# Patient Record
Sex: MALE | Race: WHITE | Employment: OTHER | URBAN - METROPOLITAN AREA
[De-identification: names, ages, dates, MRNs, and addresses within clinical notes are randomized per-mention and may not be internally consistent; named-entity substitution may affect disease eponyms.]

---

## 2017-02-07 ENCOUNTER — TELEPHONE (OUTPATIENT)
Dept: FAMILY MEDICINE | Facility: CLINIC | Age: 62
End: 2017-02-07

## 2017-02-08 NOTE — TELEPHONE ENCOUNTER
Omeprazole       Last Written Prescription Date: 09/21/2016  Last Fill Quantity: 90,  # refills: 3   Last Office Visit with G, [facility] or [facility] prescribing provider: 11/07/2016

## 2017-02-08 NOTE — TELEPHONE ENCOUNTER
MA - please call pharmacy and verify of the refill requested below.  Last refilled on 9/21/16 for a year worth of refills and it has been confirmed.    Beatrice MORRISON, RN, BSN

## 2017-04-29 DIAGNOSIS — F41.9 ANXIETY: ICD-10-CM

## 2017-05-01 RX ORDER — CITALOPRAM HYDROBROMIDE 20 MG/1
TABLET ORAL
Qty: 90 TABLET | Refills: 0 | Status: SHIPPED | OUTPATIENT
Start: 2017-05-01 | End: 2017-07-11

## 2017-05-01 NOTE — TELEPHONE ENCOUNTER
Prescription approved per [facility].  Guerda Thomas RN

## 2017-07-07 ENCOUNTER — MYC MEDICAL ADVICE (OUTPATIENT)
Dept: FAMILY MEDICINE | Facility: CLINIC | Age: 62
End: 2017-07-07

## 2017-07-11 ENCOUNTER — OFFICE VISIT (OUTPATIENT)
Dept: FAMILY MEDICINE | Facility: CLINIC | Age: 62
End: 2017-07-11
Payer: COMMERCIAL

## 2017-07-11 VITALS
BODY MASS INDEX: 21.66 KG/M2 | HEIGHT: 65 IN | WEIGHT: 130 LBS | HEART RATE: 66 BPM | SYSTOLIC BLOOD PRESSURE: 110 MMHG | OXYGEN SATURATION: 99 % | DIASTOLIC BLOOD PRESSURE: 62 MMHG | TEMPERATURE: 98.3 F

## 2017-07-11 DIAGNOSIS — G25.81 RESTLESS LEGS SYNDROME (RLS): Primary | ICD-10-CM

## 2017-07-11 DIAGNOSIS — Z87.891 HISTORY OF TOBACCO USE: ICD-10-CM

## 2017-07-11 DIAGNOSIS — F41.9 ANXIETY: ICD-10-CM

## 2017-07-11 PROCEDURE — 99214 OFFICE O/P EST MOD 30 MIN: CPT | Performed by: FAMILY MEDICINE

## 2017-07-11 PROCEDURE — G0296 VISIT TO DETERM LDCT ELIG: HCPCS | Performed by: FAMILY MEDICINE

## 2017-07-11 RX ORDER — CITALOPRAM HYDROBROMIDE 20 MG/1
TABLET ORAL
Qty: 90 TABLET | Refills: 1 | Status: SHIPPED | OUTPATIENT
Start: 2017-07-11

## 2017-07-11 RX ORDER — PRAMIPEXOLE DIHYDROCHLORIDE 0.25 MG/1
0.25 TABLET ORAL AT BEDTIME
Qty: 30 TABLET | Refills: 1 | Status: SHIPPED | OUTPATIENT
Start: 2017-07-11 | End: 2017-08-11

## 2017-07-11 NOTE — MR AVS SNAPSHOT
After Visit Summary   7/11/2017    Tanisha Albrecht    MRN: [id number]           Patient Information     Date Of Birth          1955        Visit Information        Provider Department      7/11/2017 5:40 PM Lovely Oconnell MD [facility]        Today's Diagnoses     History of tobacco use    -  1    Anxiety        Restless legs syndrome (RLS)          Care Instructions      Lung Cancer Screening   Frequently Asked Questions  If you are at high-risk for lung cancer, getting screened with low-dose computed tomography (LDCT) every year can help save your life. This handout offers answers to some of the most common questions about lung cancer screening. If you have other questions, please call [phone] ([phone]).     What is it?  Lung cancer screening uses special X-ray technology to create an image of your lung tissue. The exam is quick and easy and takes less than 10 seconds. We don t give you any medicine or use any needles. You can eat before and after the exam. You don t need to change your clothes as long as the clothing on your chest doesn t contain metal. But, you do need to be able to hold your breath for at least 6 seconds during the exam.    What is the goal of lung cancer screening?  The goal of lung cancer screening is to save lives. Many times, lung cancer is not found until a person starts having physical symptoms. Lung cancer screening can help detect lung cancer in the earliest stages when it may be easier to treat.    Who should be screened for lung cancer?  We suggest lung cancer screening for anyone who is at high-risk for lung cancer. You are in the high-risk group if you:      are between the ages of 55 and 79, and    have smoked at least 1 pack of cigarettes a day for 30 or more years, and    still smoke or have quit within the past 15 years.    However, if you have a new cough or shortness of breath, you should talk to your doctor before being  screened.    Some national lung health advocacy groups also recommend screening for people ages 50 to 79 who have smoked an average of 1 pack of cigarettes a day for 20 years. They must also have at least 1 other risk factor for lung cancer, not including exposure to secondhand smoke. Other risk factors are having had cancer in the past, emphysema, pulmonary fibrosis, COPD, a family history of lung cancer, or exposure to certain materials such as arsenic, asbestos, beryllium, cadmium, chromium, diesel fumes, nickel, radon or silica. Your care team can help you know if you have one of these risk factors.     Why does it matter if I have symptoms?  Certain symptoms can be a sign that you have a condition in your lungs that should be checked and treated by your doctor. These symptoms include fever, chest pain, a new or changing cough, shortness of breath that you have never felt before, coughing up blood or unexplained weight loss. Having any of these symptoms can greatly affect the results of lung cancer screening.       Should all smokers get an LDCT lung cancer screening exam?  It depends. Lung cancer screening is for a very specific group of men and women who have a history of heavy smoking over a long period of time (see  Who should be screened for lung cancer  above).  I am in the high-risk group, but have been diagnosed with cancer in the past. Is LDCT lung cancer screening right for me?  In some cases, you should not have LDCT lung screening, such as when your doctor is already following your cancer with CT scan studies. Your doctor will help you decide if LDCT lung screening is right for you.  Do I need to have a screening exam every year?  Yes. If you are in the high-risk group described earlier, you should get an LDCT lung cancer screening exam every year until you are 79, or are no longer willing or able to undergo screening and possible procedures to diagnose and treat lung cancer.  How effective is LDCT  at preventing death from lung cancer?  Studies have shown that LDCT lung cancer screening can lower the risk of death from lung cancer by 20 percent in people who are at high-risk.  What are the risks?  There are some risks and limitations of LDCT lung cancer screening. We want to make sure you understand the risks and benefits, so please let us know if you have any questions. Your doctor may want to talk with you more about these risks.    Radiation exposure: As with any exam that uses radiation, there is a very small increased risk of cancer. The amount of radiation in LDCT is small--about the same amount a person would get from a mammogram. Your doctor orders the exam when he or she feels the potential benefits outweigh the risks.    False negatives: No test is perfect, including LDCT. It is possible that you may have a medical condition, including lung cancer, that is not found during your exam. This is called a false negative result.    False positives and more testing: LDCT very often finds something in the lung that could be cancer, but in fact is not. This is called a false positive result. False positive tests often cause anxiety. To make sure these findings are not cancer, you may need to have more tests. These tests will be done only if you give us permission. Sometimes patients need a treatment that can have side effects, such as a biopsy. For more information on false positives, see  What can I expect from the results?     Findings not related to lung cancer: Your LDCT exam also takes pictures of areas of your body next to your lungs. In a very small number of cases, the CT scan will show an abnormal finding in one of these areas, such as your kidneys, adrenal glands, liver or thyroid. This finding may not be serious, but you may need more tests. Your doctor can help you decide what other tests you may need, if any.  What can I expect from the results?  About 1 out of 4 LDCT exams will find something  that may need more tests. Most of the time, these findings are lung nodules. Lung nodules are very small collections of tissue in the lung. These nodules are very common, and the vast majority--more than 97 percent--are not cancer (benign). Most are normal lymph nodes or small areas of scarring from past infections.  But, if a small lung nodule is found to be cancer, the cancer can be cured more than 90 percent of the time. To know if the nodule is cancer, we may need to get more images before your next yearly screening exam. If the nodule has suspicious features (for example, it is large, has an odd shape or grows over time), we will refer you to a specialist for further testing.  Will my doctor also get the results?  Yes. Your doctor will get a copy of your results.  Is it okay to keep smoking now that there s a cancer screening exam?  No. Tobacco is one of the strongest cancer-causing agents. It causes not only lung cancer, but other cancers and cardiovascular (heart) diseases as well. The damage caused by smoking builds over time. This means that the longer you smoke, the higher your risk of disease. While it is never too late to quit, the sooner you quit, the better.  Where can I find help to quit smoking?  The best way to prevent lung cancer is to stop smoking. If you have already quit smoking, congratulations and keep it up! For help on quitting smoking, please call [employer] at 2-239-168-ZYAV ([phone] or the American Cancer Society at 1-559.957.3489 to find local resources near you.  One-on-one health coaching:  If you d prefer to work individually with a health care provider on tobacco cessation, we offer:      Medication Therapy Management:  Our specially trained pharmacists work closely with you and your doctor to help you quit smoking.  Call [phone] or [phone] (toll free).     Can Do: Health coaching offered by [facility].  [email or web address]            Follow-ups after your  "visit        Future tests that were ordered for you today     Open Future Orders        Priority Expected Expires Ordered    CT Chest Lung Cancer Scrn Low Dose wo Routine  7/11/2018 7/11/2017            Who to contact     If you have questions or need follow up information about today's clinic visit or your schedule please contact [facility] directly at [phone].  Normal or non-critical lab and imaging results will be communicated to you by MyChart, letter or phone within 4 business days after the clinic has received the results. If you do not hear from us within 7 days, please contact the clinic through Shopsense[facility] or phone. If you have a critical or abnormal lab result, we will notify you by phone as soon as possible.  Submit refill requests through [employer] or call your pharmacy and they will forward the refill request to us. Please allow 3 business days for your refill to be completed.          Additional Information About Your Visit        Shops[facility]bre Information     [facility] gives you secure access to your electronic health record. If you see a primary care provider, you can also send messages to your care team and make appointments. If you have questions, please call your primary care clinic.  If you do not have a primary care provider, please call [phone] and they will assist you.        Care EveryWhere ID     This is your Care EveryWhere ID. This could be used by other organizations to access your [place] medical records  [id number]        Your Vitals Were     Pulse Temperature Height Pulse Oximetry BMI (Body Mass Index)       66 98.3  F (36.8  C) (Oral) 5' 5\" (1.651 m) 99% 21.63 kg/m2        Blood Pressure from Last 3 Encounters:   07/11/17 110/62   11/07/16 114/60   10/03/16 106/70    Weight from Last 3 Encounters:   07/11/17 130 lb (59 kg)   09/21/16 127 lb (57.6 kg)   01/05/16 128 lb (58.1 kg)              We Performed the Following     Okay for Smoking Cessation Study (PLUTO) to Contact " Patient     Prof fee: Shared Decisionmaking for Lung Cancer Screening          Today's Medication Changes          These changes are accurate as of: 7/11/17  6:18 PM.  If you have any questions, ask your nurse or doctor.               Start taking these medicines.        Dose/Directions    pramipexole 0.25 MG tablet   Commonly known as:  MIRAPEX   Used for:  Restless legs syndrome (RLS)   Started by:  Lovely Oconnell MD        Dose:  0.25 mg   Take 1 tablet (0.25 mg) by mouth At Bedtime   Quantity:  30 tablet   Refills:  1         These medicines have changed or have updated prescriptions.        Dose/Directions    citalopram 20 MG tablet   Commonly known as:  celeXA   This may have changed:  See the new instructions.   Used for:  Anxiety   Changed by:  Lovely Oconnell MD        TAKE 1 TABLET(20 MG) BY MOUTH DAILY   Quantity:  90 tablet   Refills:  1       * nicotine polacrilex 4 MG gum   Commonly known as:  NICORETTE   This may have changed:  Another medication with the same name was added. Make sure you understand how and when to take each.   Used for:  Cigarette smoker   Changed by:  Lovely Oconnell MD        Dose:  4 mg   Place 1 each (4 mg) inside cheek as needed for smoking cessation   Quantity:  270 tablet   Refills:  1       * nicotine polacrilex 4 MG gum   Commonly known as:  NICORETTE   This may have changed:  You were already taking a medication with the same name, and this prescription was added. Make sure you understand how and when to take each.   Used for:  History of tobacco use   Changed by:  Lovely Oconnell MD        Dose:  4 mg   Place 1 each (4 mg) inside cheek as needed for smoking cessation   Quantity:  270 tablet   Refills:  1       * Notice:  This list has 2 medication(s) that are the same as other medications prescribed for you. Read the directions carefully, and ask your doctor or other care provider to review them with you.         Where to get your medicines      These medications were sent to  [facility] - [address]  [address] [facility] [address]     Phone:  [phone]     citalopram 20 MG tablet    nicotine polacrilex 4 MG gum         These medications were sent to [facility] [place], [place] - [address]  [address], [facility] [address]     Phone:  [phone]     pramipexole 0.25 MG tablet                Primary Care Provider Office Phone # Fax #    Lovely Oconnell [phone]       [facility] [address] [address]        Equal Access to Services     ARLIN JERRY : Hadii russell girard hadasho Soomaali, waaxda luqadaha, qaybta kaalmada adeegyada, mirela nguyen . So [facility] [phone].    ATENCIÓN: Si habla español, tiene a robles disposición servicios gratuitos de asistencia lingüística. [facility] [phone].    We comply with applicable federal civil rights laws and [place] laws. We do not discriminate on the basis of race, color, national origin, age, disability sex, sexual orientation or gender identity.            Thank you!     Thank you for choosing [facility]  for your care. Our goal is always to provide you with excellent care. Hearing back from our patients is one way we can continue to improve our services. Please take a few minutes to complete the written survey that you may receive in the mail after your visit with us. Thank you!             Your Updated Medication List - Protect others around you: Learn how to safely use, store and throw away your medicines at www.disposemymeds.org.          This list is accurate as of: 7/11/17  6:18 PM.  Always use your most recent med list.                   Brand Name Dispense Instructions for use Diagnosis    aspirin 81 MG tablet      Take 1 tablet by mouth daily.        citalopram 20 MG tablet    celeXA    90 tablet    TAKE 1 TABLET(20 MG) BY MOUTH DAILY    Anxiety        clonazePAM 1 MG tablet    klonoPIN    30 tablet    Take 1 tablet (1 mg) by mouth nightly as needed for anxiety    Restless legs syndrome (RLS)       loratadine 10 MG tablet    CLARITIN     1 TABLET DAILY        MULTI VITAMIN MENS PO           * nicotine polacrilex 4 MG gum    NICORETTE    270 tablet    Place 1 each (4 mg) inside cheek as needed for smoking cessation    Cigarette smoker       * nicotine polacrilex 4 MG gum    NICORETTE    270 tablet    Place 1 each (4 mg) inside cheek as needed for smoking cessation    History of tobacco use       omeprazole 20 MG CR capsule    priLOSEC    90 capsule    TAKE 1 CAPSULE BY MOUTH ONCE DAILY    Gastroesophageal reflux disease without esophagitis       polyethylene glycol powder    MIRALAX    510 g    Take 17 g (1 capful) by mouth daily    Chronic constipation       pramipexole 0.25 MG tablet    MIRAPEX    30 tablet    Take 1 tablet (0.25 mg) by mouth At Bedtime    Restless legs syndrome (RLS)       simvastatin 20 MG tablet    ZOCOR    90 tablet    Take 1 tablet (20 mg) by mouth At Bedtime    Hyperlipidemia LDL goal <130       tamsulosin 0.4 MG capsule    FLOMAX    90 capsule    Take 1 capsule (0.4 mg) by mouth At Bedtime    Benign non-nodular prostatic hyperplasia with lower urinary tract symptoms       * Notice:  This list has 2 medication(s) that are the same as other medications prescribed for you. Read the directions carefully, and ask your doctor or other care provider to review them with you.

## 2017-07-11 NOTE — PROGRESS NOTES
Lung Cancer Screening Shared Decision Making Visit     Tanisha Albrecht is eligible for lung cancer screening on the basis of the information provided in my signed lung cancer screening order.     I have discussed with patient the risks and benefits of screening for lung cancer with low-dose CT.     The risks include:   radiation exposure    false positives     over-diagnosis    The benefit of early detection of lung cancer is contingent upon adherence to annual screening or more frequent follow up if indicated.     Furthermore, reaping the benefits of screening requires Tanisha Albrecht to be willing and physically able to undergo diagnostic procedures, if indicated. Although no specific guide is available for determining severity of comorbidities, it is reasonable to withhold screening in patients who have greater mortality risk from other diseases.     We did discusShouldIScreens that the only way to prevent lung cancer is to not smoke. Smoking cessation assistance was offered.

## 2017-07-11 NOTE — PATIENT INSTRUCTIONS

## 2017-07-11 NOTE — PROGRESS NOTES
SUBJECTIVE:                                                    Tanisha Albrecht is a 62 year old male who presents to clinic today for the following health issues:    Musculoskeletal problem/pain      Duration: has been going on for years but getting worse    Description  Location: restless legs and Periodic Leg movement  He has taken Klonipin for years    Intensity:  moderate    Accompanying signs and symptoms: twitches, gets real aggravatted    History  Previous similar problem: YES  Previous evaluation:  Sleep study    Precipitating or alleviating factors:  Trauma or overuse: no   Aggravating factors include: sleeping    Therapies tried and outcome: Klonopin 1 mg which helps but wanting to get something stronger if available          Problem list and histories reviewed & adjusted, as indicated.  Additional history: as documented    Patient Active Problem List   Diagnosis     GERD (gastroesophageal reflux disease)     Cigarette smoker     Anxiety     Hyperlipidemia LDL goal <130     Periodic limb movement sleep disorder     Advanced directives, counseling/discussion     Gallstones     History of depression     Disturbance in sleep behavior     Gastroesophageal reflux disease without esophagitis     Tubular adenoma of colon     Essential hypertension with goal blood pressure less than 140/90     Restless legs syndrome (RLS)     Hydronephrosis, unspecified hydronephrosis type     Male impotence     No past surgical history on file.    Social History   Substance Use Topics     Smoking status: Current Every Day Smoker     Packs/day: 0.50     Years: 25.00     Types: Cigarettes     Smokeless tobacco: Former User     Types: Chew     Alcohol use 0.0 oz/week     0 Standard drinks or equivalent per week      Comment: Occasionally     Family History   Problem Relation Age of Onset     HEART DISEASE Mother      Alzheimer Disease Father      CANCER Father      CANCER Brother      pancreas     DIABETES Father      CANCER Other       melanoma         Current Outpatient Prescriptions   Medication Sig Dispense Refill     clonazePAM (KLONOPIN) 1 MG tablet Take 1 tablet (1 mg) by mouth nightly as needed for anxiety 30 tablet 0     citalopram (CELEXA) 20 MG tablet TAKE 1 TABLET(20 MG) BY MOUTH DAILY 90 tablet 1     nicotine polacrilex (NICORETTE) 4 MG gum Place 1 each (4 mg) inside cheek as needed for smoking cessation 270 tablet 1     pramipexole (MIRAPEX) 0.25 MG tablet Take 1 tablet (0.25 mg) by mouth At Bedtime 30 tablet 1     tamsulosin (FLOMAX) 0.4 MG 24 hr capsule Take 1 capsule (0.4 mg) by mouth At Bedtime 90 capsule 3     simvastatin (ZOCOR) 20 MG tablet Take 1 tablet (20 mg) by mouth At Bedtime 90 tablet 3     omeprazole (PRILOSEC) 20 MG capsule TAKE 1 CAPSULE BY MOUTH ONCE DAILY 90 capsule 3     polyethylene glycol (MIRALAX) powder Take 17 g (1 capful) by mouth daily 510 g 1     aspirin 81 MG tablet Take 1 tablet by mouth daily.       MULTI VITAMIN MENS PO        LORATADINE 10 MG PO TABS 1 TABLET DAILY       [DISCONTINUED] clonazePAM (KLONOPIN) 1 MG tablet Due for MD visit  0     [DISCONTINUED] citalopram (CELEXA) 20 MG tablet TAKE 1 TABLET(20 MG) BY MOUTH DAILY 90 tablet 0     nicotine polacrilex (NICORETTE) 4 MG gum Place 1 each (4 mg) inside cheek as needed for smoking cessation (Patient not taking: Reported on 7/11/2017) 270 tablet 1     Allergies   Allergen Reactions     Chantix [Varenicline Tartrate]      Wellbutrin [Bupropion Hcl]      Varenicline Anxiety     Recent Labs   Lab Test  09/21/16   1542  01/06/16   1339  07/28/15   1535  06/18/15   1601   11/21/14   1218   LDL  106*  80   --    --    --   75   HDL  50  59   --    --    --   51   TRIG  75  121   --    --    --   91   ALT  16  17   --   16   < >  21   CR  0.86  0.93   --   0.86   < >  0.89   GFRESTIMATED  >90  Non  GFR Calc    83   --   >90  Non  GFR Calc     < >  87   GFRESTBLACK  >90   GFR Calc    >90    "GFR Calc     --   >90   GFR Calc     < >  >90   GFR Calc     POTASSIUM  4.1  4.6   --   4.3   < >  4.3   TSH  0.61   --   0.62   --    < >   --     < > = values in this interval not displayed.      BP Readings from Last 3 Encounters:   07/11/17 110/62   11/07/16 114/60   10/03/16 106/70    Wt Readings from Last 3 Encounters:   07/11/17 130 lb (59 kg)   09/21/16 127 lb (57.6 kg)   01/05/16 128 lb (58.1 kg)            Anxiety Follow-Up    Status since last visit: stable     Other associated symptoms:None    Complicating factors:   Significant life event: No   Current substance abuse: None  Depression symptoms: No  RULA-7 SCORE 6/23/2015 9/19/2016 9/21/2016   Total Score 0 - -   Total Score - - -   Total Score - 1 7       GAD7            Amount of exercise or physical activity: None    Problems taking medications regularly: No    Medication side effects: none              Labs reviewed in EPIC    Reviewed and updated as needed this visit by clinical staff       Reviewed and updated as needed this visit by Provider         ROS:  C: NEGATIVE for fever, chills, change in weight  R: NEGATIVE for significant cough or SOB  CV: NEGATIVE for chest pain, palpitations or peripheral edema  PSYCHIATRIC: NEGATIVE for changes in mood or affect    OBJECTIVE:     /62 (BP Location: Right arm, Patient Position: Chair, Cuff Size: Adult Regular)  Pulse 66  Temp 98.3  F (36.8  C) (Oral)  Ht 5' 5\" (1.651 m)  Wt 130 lb (59 kg)  SpO2 99%  BMI 21.63 kg/m2  Body mass index is 21.63 kg/(m^2).  GENERAL: healthy, alert and no distress  NECK: no adenopathy, no asymmetry, masses, or scars and thyroid normal to palpation  RESP: lungs clear to auscultation - no rales, rhonchi or wheezes  CV: regular rate and rhythm, normal S1 S2, no S3 or S4, no murmur, click or rub, no peripheral edema and peripheral pulses strong  ABDOMEN: soft, nontender, no hepatosplenomegaly, no masses and bowel sounds normal  MS: no " gross musculoskeletal defects noted, no edema  PSYCH: mentation appears normal, affect normal/bright    Diagnostic Test Results:  none     ASSESSMENT/PLAN:         Tobacco Cessation:   reports that he has been smoking Cigarettes.  He has a 12.50 pack-year smoking history. He has quit using smokeless tobacco. His smokeless tobacco use included Chew.  Tobacco Cessation Action Plan: Pharmacotherapies : other Nicotine replacement    1. Restless legs syndrome (RLS)  Advised Try Mirapex  SEE [facility] orders  The potential side effects of this medication have been discussed with the patient.  Call if any significant problems with these are experienced.  If Not better will continue Klonipin  - pramipexole (MIRAPEX) 0.25 MG tablet; Take 1 tablet (0.25 mg) by mouth At Bedtime  Dispense: 30 tablet; Refill: 1    2. Anxiety  Doing well-see RULA and PHQ9  - citalopram (CELEXA) 20 MG tablet; TAKE 1 TABLET(20 MG) BY MOUTH DAILY  Dispense: 90 tablet; Refill: 1      3. History of tobacco use  Advised smoking cessation  Pt wants Gum  SEE [facility] orders  The potential side effects of this medication have been discussed with the patient.  Call if any significant problems with these are experienced.    - Prof fee: Shared Decisionmaking for Lung Cancer Screening  - CT Chest Lung Cancer Scrn Low Dose wo; Future  - Okay for Smoking Cessation Study (PLUTO) to Contact Patient  - nicotine polacrilex (NICORETTE) 4 MG gum; Place 1 each (4 mg) inside cheek as needed for smoking cessation  Dispense: 270 tablet; Refill: 1  Follow up if This does not work  Lovely Oconnell MD  [facility]

## 2017-07-11 NOTE — NURSING NOTE
"Chief Complaint   Patient presents with     Musculoskeletal Problem     restless legs       Initial /62 (BP Location: Right arm, Patient Position: Chair, Cuff Size: Adult Regular)  Pulse 66  Temp 98.3  F (36.8  C) (Oral)  Ht 5' 5\" (1.651 m)  Wt 130 lb (59 kg)  SpO2 99%  BMI 21.63 kg/m2 Estimated body mass index is 21.63 kg/(m^2) as calculated from the following:    Height as of this encounter: 5' 5\" (1.651 m).    Weight as of this encounter: 130 lb (59 kg).  Medication Reconciliation: complete    "

## 2017-07-12 ASSESSMENT — PATIENT HEALTH QUESTIONNAIRE - PHQ9: SUM OF ALL RESPONSES TO PHQ QUESTIONS 1-9: 5

## 2017-07-13 ENCOUNTER — RADIANT APPOINTMENT (OUTPATIENT)
Dept: CT IMAGING | Facility: CLINIC | Age: 62
End: 2017-07-13
Attending: FAMILY MEDICINE
Payer: COMMERCIAL

## 2017-07-13 DIAGNOSIS — Z87.891 HISTORY OF TOBACCO USE: ICD-10-CM

## 2017-07-13 PROCEDURE — G0297 LDCT FOR LUNG CA SCREEN: HCPCS | Mod: TC

## 2017-07-27 DIAGNOSIS — F41.9 ANXIETY: ICD-10-CM

## 2017-07-27 RX ORDER — CITALOPRAM HYDROBROMIDE 20 MG/1
TABLET ORAL
Qty: 90 TABLET | Refills: 0
Start: 2017-07-27

## 2017-07-27 NOTE — TELEPHONE ENCOUNTER
Called pharmacy and told them that this prescription is being sent to a different location.     Jessica Sears MA

## 2017-08-13 DIAGNOSIS — K21.9 GASTROESOPHAGEAL REFLUX DISEASE WITHOUT ESOPHAGITIS: ICD-10-CM

## 2017-08-14 NOTE — TELEPHONE ENCOUNTER
Omeprazole       Last Written Prescription Date: 08/11/2017  Last Fill Quantity: 90,  # refills: 3   Last Office Visit with [facility], [facility] or [facility] prescribing provider: 07/11/217

## 2017-08-31 ENCOUNTER — MYC MEDICAL ADVICE (OUTPATIENT)
Dept: FAMILY MEDICINE | Facility: CLINIC | Age: 62
End: 2017-08-31

## 2017-08-31 DIAGNOSIS — G25.81 RESTLESS LEGS SYNDROME (RLS): ICD-10-CM

## 2017-08-31 RX ORDER — CLONAZEPAM 1 MG/1
TABLET ORAL
Qty: 30 TABLET | Refills: 0 | Status: CANCELLED | OUTPATIENT
Start: 2017-08-31

## 2017-09-01 RX ORDER — CLONAZEPAM 1 MG/1
1 TABLET ORAL
Qty: 30 TABLET | Refills: 0 | Status: SHIPPED | OUTPATIENT
Start: 2017-09-01 | End: 2017-10-05

## 2017-09-01 NOTE — TELEPHONE ENCOUNTER
Patient and patient wife sent this message:    Dear dr. madrid,  Our   insurance company is saying that instead of Tanisha seeing you (his preferred provider) we went out of network to see a [facility]. so they are not treating his visit as medical in nature.  He went in for medication refills and to change his meds. to a on line pharmacy [facility] insurance  [facility] for all long term drugs.  Tanisha and I have been taken citalopram for years for mild depression. As seniors  in todays world there is a lot of issues to get depressed about. Tanisha had side affects on Pramipexole Dihydro.25Mg  I put him back on Clonazepam 1 mg the sleep study doctor had him on. he is doing fine and sleeping all night and no leg movement. It has to be renewed again as it is a 30 day supply med. so it needs refill at [facility] #[phone] Phone # [phone].           Thank you tanisha and Maura Albrecht    Controlled Substance Refill Request for Klonopin  Problem List Complete:  No     PROVIDER TO CONSIDER COMPLETION OF PROBLEM LIST AND OVERVIEW/CONTROLLED SUBSTANCE AGREEMENT    Last Written Prescription Date:  7/11/17  Last Fill Quantity: 30,   # refills: 0    [place] with [facility] [place] provider: 7/11/17    Future Office visit:     Controlled substance agreement on file: No.       checked in past 6 months?  Yes 9/1/17   [place] reviewed:  reviewed- no concerns    [place]  [email or web address]    Lida Burgess RN

## 2017-09-06 NOTE — TELEPHONE ENCOUNTER
Confirmed prescription faxed to Pharmacy. Tessa Hardin,     [facility] [address] [facility] [address] AT [facility]

## 2017-10-01 ENCOUNTER — HEALTH MAINTENANCE LETTER (OUTPATIENT)
Age: 62
End: 2017-10-01

## 2017-10-05 NOTE — TELEPHONE ENCOUNTER
clonazePAM (KLONOPIN) 1 MG tablet      Last Written Prescription Date:  09/01/17  Last Fill Quantity: 30,   # refills: 0  Last Office Visit with [facility], [facility] or [facility] prescribing provider: 07/11/17  Future Office visit:       Routing refill request to provider for review/approval because:  Drug not on the [facility], [facility] or [facility] refill protocol or controlled substance      Catherine Marsh CMA

## 2017-10-06 NOTE — TELEPHONE ENCOUNTER
Routing refill request to provider for review/approval because:  Drug not on the [facility] refill protocol  Melany Vela RN

## 2017-10-13 RX ORDER — CLONAZEPAM 1 MG/1
1 TABLET ORAL
Qty: 30 TABLET | Refills: 0 | Status: SHIPPED | OUTPATIENT
Start: 2017-10-13 | End: 2018-04-24

## 2017-10-17 NOTE — TELEPHONE ENCOUNTER
Confirmed prescription faxed to Pharmacy. Tessa Hardin,      [facility] - [facility], [place] [address]

## 2017-11-01 ENCOUNTER — TRANSFERRED RECORDS (OUTPATIENT)
Dept: HEALTH INFORMATION MANAGEMENT | Facility: CLINIC | Age: 62
End: 2017-11-01

## 2017-11-01 ENCOUNTER — EXTERNAL ORDER RESULTS (OUTPATIENT)
Dept: HEALTH INFORMATION MANAGEMENT | Facility: CLINIC | Age: 62
End: 2017-11-01

## 2017-12-14 ENCOUNTER — TRANSFERRED RECORDS (OUTPATIENT)
Dept: HEALTH INFORMATION MANAGEMENT | Facility: CLINIC | Age: 62
End: 2017-12-14

## 2018-01-08 ENCOUNTER — TRANSFERRED RECORDS (OUTPATIENT)
Dept: HEALTH INFORMATION MANAGEMENT | Facility: CLINIC | Age: 63
End: 2018-01-08

## 2018-02-13 ENCOUNTER — TELEPHONE (OUTPATIENT)
Dept: NEUROLOGY | Facility: CLINIC | Age: 63
End: 2018-02-13

## 2018-02-13 NOTE — TELEPHONE ENCOUNTER
I attempted to call patient at phone number listed [phone] to inform pt that we are not allowed to sent records from another facility to an outside facility but phone number is invalid.    I called patient listed phone at [phone] and spoke with Maura and communicated that they will dorinda to get the information transferred themselves. Maura verbalized understanding    AKIN Christensen LPN

## 2018-02-13 NOTE — TELEPHONE ENCOUNTER
Spouse Maura called, patient unable to be seen by , insurance will not cover Provider. Patient will be going to [facility], located at [address] in [place] on Wednesday February 28, 2018. Patient would like the information received to be sent to this location, patient will be seeing Dr. Isiah Borja. If an TANISHA is needed for the patient to sign off and release the received information, please send the form to his home address and he will sign it.    Thank you.    Hui HOANG [facility]~Specialty/Med Surg   [phone]

## 2018-04-24 ENCOUNTER — OFFICE VISIT (OUTPATIENT)
Dept: INTERNAL MEDICINE | Facility: CLINIC | Age: 63
End: 2018-04-24
Payer: COMMERCIAL

## 2018-04-24 VITALS
SYSTOLIC BLOOD PRESSURE: 103 MMHG | DIASTOLIC BLOOD PRESSURE: 64 MMHG | BODY MASS INDEX: 21.83 KG/M2 | HEIGHT: 65 IN | WEIGHT: 131 LBS | HEART RATE: 61 BPM | OXYGEN SATURATION: 99 % | TEMPERATURE: 97.4 F | RESPIRATION RATE: 14 BRPM

## 2018-04-24 DIAGNOSIS — Z01.818 PREOP GENERAL PHYSICAL EXAM: Primary | ICD-10-CM

## 2018-04-24 DIAGNOSIS — J30.2 SEASONAL ALLERGIC RHINITIS, UNSPECIFIED CHRONICITY, UNSPECIFIED TRIGGER: ICD-10-CM

## 2018-04-24 DIAGNOSIS — G31.83 LEWY BODY DEMENTIA WITH BEHAVIORAL DISTURBANCE (H): ICD-10-CM

## 2018-04-24 DIAGNOSIS — F02.818 LEWY BODY DEMENTIA WITH BEHAVIORAL DISTURBANCE (H): ICD-10-CM

## 2018-04-24 DIAGNOSIS — H54.7 DECREASED VISUAL ACUITY: ICD-10-CM

## 2018-04-24 DIAGNOSIS — E78.5 HYPERLIPIDEMIA LDL GOAL <130: ICD-10-CM

## 2018-04-24 PROBLEM — F02.80 LEWY BODY DEMENTIA (H): Status: ACTIVE | Noted: 2018-04-24

## 2018-04-24 LAB
ANION GAP SERPL CALCULATED.3IONS-SCNC: 6 MMOL/L (ref 3–14)
BUN SERPL-MCNC: 17 MG/DL (ref 7–30)
CALCIUM SERPL-MCNC: 9.4 MG/DL (ref 8.5–10.1)
CHLORIDE SERPL-SCNC: 102 MMOL/L (ref 94–109)
CO2 SERPL-SCNC: 29 MMOL/L (ref 20–32)
CREAT SERPL-MCNC: 0.98 MG/DL (ref 0.66–1.25)
GFR SERPL CREATININE-BSD FRML MDRD: 77 ML/MIN/1.7M2
GLUCOSE SERPL-MCNC: 91 MG/DL (ref 70–99)
HGB BLD-MCNC: 13.2 G/DL (ref 13.3–17.7)
LDLC SERPL DIRECT ASSAY-MCNC: 95 MG/DL
POTASSIUM SERPL-SCNC: 4.2 MMOL/L (ref 3.4–5.3)
SODIUM SERPL-SCNC: 137 MMOL/L (ref 133–144)

## 2018-04-24 PROCEDURE — 36415 COLL VENOUS BLD VENIPUNCTURE: CPT | Performed by: INTERNAL MEDICINE

## 2018-04-24 PROCEDURE — 83721 ASSAY OF BLOOD LIPOPROTEIN: CPT | Performed by: INTERNAL MEDICINE

## 2018-04-24 PROCEDURE — 85018 HEMOGLOBIN: CPT | Performed by: INTERNAL MEDICINE

## 2018-04-24 PROCEDURE — 99215 OFFICE O/P EST HI 40 MIN: CPT | Performed by: INTERNAL MEDICINE

## 2018-04-24 PROCEDURE — 80048 BASIC METABOLIC PNL TOTAL CA: CPT | Performed by: INTERNAL MEDICINE

## 2018-04-24 PROCEDURE — 93000 ELECTROCARDIOGRAM COMPLETE: CPT | Performed by: INTERNAL MEDICINE

## 2018-04-24 RX ORDER — SIMVASTATIN 20 MG
20 TABLET ORAL AT BEDTIME
Qty: 90 TABLET | Refills: 1 | Status: SHIPPED | OUTPATIENT
Start: 2018-04-24 | End: 2018-10-03

## 2018-04-24 RX ORDER — LORATADINE 10 MG/1
1 TABLET ORAL DAILY
Qty: 90 TABLET | Refills: 1 | Status: SHIPPED | OUTPATIENT
Start: 2018-04-24

## 2018-04-24 ASSESSMENT — PAIN SCALES - GENERAL: PAINLEVEL: NO PAIN (0)

## 2018-04-24 NOTE — Clinical Note
Please fax my preoperative note to the Surgeon's fax number, for the patient's planned surgery on 5.10.18 (date).   and include the recent EKG. Thank you,  Herminia Mathews MD

## 2018-04-24 NOTE — NURSING NOTE
"Chief Complaint   Patient presents with     Pre-Op Exam       Initial /64  Pulse 61  Temp 97.4  F (36.3  C) (Oral)  Resp 14  Ht 5' 5\" (1.651 m)  Wt 131 lb (59.4 kg)  SpO2 99%  BMI 21.8 kg/m2 Estimated body mass index is 21.8 kg/(m^2) as calculated from the following:    Height as of this encounter: 5' 5\" (1.651 m).    Weight as of this encounter: 131 lb (59.4 kg).  Medication Reconciliation: complete  Jessica Bauer M.A.    "

## 2018-04-24 NOTE — PROGRESS NOTES
[facility]  [address] [facility] [address] [phone]  Dept: [phone]    PRE-OP EVALUATION:  Today's date: 2018    Tanisha Albrecht (: 1955) presents for pre-operative evaluation assessment as requested by Dr. Duncan .  He requires evaluation and anesthesia risk assessment prior to undergoing surgery/procedure for treatment of decreased vision of the left eye.    Proposed Surgery/ Procedure: left eye   Date of Surgery/ Procedure: 05/10/2018  Time of Surgery/ Procedure: 1:00pm  Hospital/Surgical Facility: [facility]  Fax number for surgical facility: [phone]  Primary Physician: Lovely Oconnell  Type of Anesthesia Anticipated: Local with MAC    Patient has a Health Care Directive or Living Will:  NO    1. NO - Do you have a history of heart attack, stroke, stent, bypass or surgery on an artery in the head, neck, heart or legs?  2. NO - Do you ever have any pain or discomfort in your chest?  3. NO - Do you have a history of  Heart Failure?  4. NO - Are you troubled by shortness of breath when: walking on the level, up a slight hill or at night?  5. NO - Do you currently have a cold, bronchitis or other respiratory infection?  6. NO - Do you have a cough, shortness of breath or wheezing?  7. NO - Do you sometimes get pains in the calves of your legs when you walk?  8. NO - Do you or anyone in your family have previous history of blood clots?  9. NO - Do you or does anyone in your family have a serious bleeding problem such as prolonged bleeding following surgeries or cuts?  10. NO - Have you ever had problems with anemia or been told to take iron pills?  11. NO - Have you had any abnormal blood loss such as black, tarry or bloody stools, or abnormal vaginal bleeding?  12. NO - Have you ever had a blood transfusion?  13. NO - Have you or any of your relatives ever had problems with anesthesia?  14. NO - Do you have sleep apnea, excessive snoring or daytime  drowsiness?  15. NO - Do you have any prosthetic heart valves?  16. NO - Do you have prosthetic joints?  17. NO - Is there any chance that you may be pregnant?      HPI:     HPI related to upcoming procedure:   History of decreased vision of the left eye.   Retina specialist has advised that the patient undergo the above surgery of the left eye to check for retinal detachment or tumor. His specialist noted a yellowish vitreous hemorrhage.       Patient may be switching care from [place] to [place]-as location is more convenient.  We will scan the recent neuropsychol testing report today.     DEPRESSION - Patient has a long history of Depression of moderate severity requiring medication for control with recent symptoms being waxing and waning..Current symptoms of depression include none.                                                                                                                                                                                    .  HYPERTENSION - Patient has longstanding history of HTN , currently denies any symptoms referable to elevated blood pressure. Specifically denies chest pain, palpitations, dyspnea, orthopnea, PND or peripheral edema. Blood pressure readings have been in normal range. Current medication regimen is as listed below. Patient denies any side effects of medication.                                                                                                                                                                                          .    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 Provider to review       History of depression 11/20/2013     Priority: Medium     Advanced directives, counseling/discussion 02/23/2012     Priority: Medium     Advance Directive Problem List Overview:   Name Relationship Phone    Primary Health Care Agent            Alternative Health Care Agent          Discussed advance care planning with patient; information given to patient to review. 2/23/2012          Gallstones      Priority: Medium     Periodic limb movement sleep disorder      Priority: Medium     Hyperlipidemia LDL goal <130      Priority: Medium     Anxiety      Priority: Medium     GERD (gastroesophageal reflux disease)      Priority: Medium     Cigarette smoker      Priority: Medium      Past Medical History:   Diagnosis Date     Anxiety      Bronchospasm      Gallstones      GERD (gastroesophageal reflux disease)      HTN (hypertension)      Hyperlipidemia LDL goal <130      Mild major depression (H)      Periodic limb movement sleep disorder      No past surgical history on file.  Current Outpatient Prescriptions   Medication Sig Dispense Refill     aspirin 81 MG tablet Take 1 tablet (81 mg) by mouth daily 90 tablet 3     citalopram (CELEXA) 20 MG tablet TAKE 1 TABLET(20 MG) BY MOUTH DAILY 90 tablet 1     loratadine (CLARITIN) 10 MG tablet Take 1 tablet (10 mg) by mouth daily 90 tablet 1     Magnesium 400 MG CAPS        MELATONIN PO Take 3 mg by mouth       MULTI VITAMIN MENS PO        omeprazole (PRILOSEC) 20 MG CR capsule TAKE 1 CAPSULE BY MOUTH ONCE DAILY 90 capsule 3     polyethylene glycol (MIRALAX) powder Take 17 g (1 capful) by mouth daily 510 g 1     simvastatin (ZOCOR) 20 MG tablet Take 1 tablet (20 mg) by mouth  "At Bedtime 90 tablet 1     tamsulosin (FLOMAX) 0.4 MG capsule Take 1 capsule (0.4 mg) by mouth At Bedtime 90 capsule 3     OTC products: melatonin; vitamins. Cranberry tablets. Magnesium tablets.     Allergies   Allergen Reactions     Chantix [Varenicline Tartrate]      Wellbutrin [Bupropion Hcl]      Varenicline Anxiety      Latex Allergy: NO    Social History   Substance Use Topics     Smoking status: Current Every Day Smoker     Packs/day: 0.50     Years: 25.00     Types: Cigarettes     Smokeless tobacco: Former User     Types: Chew     Alcohol use 0.0 oz/week     0 Standard drinks or equivalent per week      Comment: Occasionally     History   Drug Use No       REVIEW OF SYSTEMS:   Constitutional, neuro, ENT, endocrine, pulmonary, cardiac, gastrointestinal, genitourinary, musculoskeletal, integument and psychiatric systems are negative, except as otherwise noted.    EXAM:   /64  Pulse 61  Temp 97.4  F (36.3  C) (Oral)  Resp 14  Ht 5' 5\" (1.651 m)  Wt 131 lb (59.4 kg)  SpO2 99%  BMI 21.8 kg/m2        GENERAL APPEARANCE: healthy, alert and no distress     EYES: EOMI,- PERRL     HENT: ear canals and TM's normal and nose and mouth without ulcers or lesions     NECK: no adenopathy, no asymmetry, masses, or scars and thyroid normal to palpation     RESP: lungs clear to auscultation - no rales, rhonchi or wheezes     CV: regular rates and rhythm, normal S1 S2, no S3 or S4 and no murmur, click or rub -     ABDOMEN:  soft, nontender, no HSM or masses and bowel sounds normal     MS: extremities normal- no gross deformities noted, no evidence of inflammation in joints, FROM in all extremities.     SKIN: no suspicious lesions or rashes     NEURO: Normal strength and tone, sensory exam grossly normal, mentation intact and speech normal     PSYCH: mentation appears normal. and affect normal/bright     LYMPHATICS: No cervical or supraclavicular nodes      DIAGNOSTICS:   EKG: sinus bradycardia, normal axis, normal " intervals, no acute ST/T changes c/w ischemia, no LVH by voltage criteria    Results for orders placed or performed in visit on 04/24/18   BASIC METABOLIC PANEL   Result Value Ref Range    Sodium 137 133 - 144 mmol/L    Potassium 4.2 3.4 - 5.3 mmol/L    Chloride 102 94 - 109 mmol/L    Carbon Dioxide 29 20 - 32 mmol/L    Anion Gap 6 3 - 14 mmol/L    Glucose 91 70 - 99 mg/dL    Urea Nitrogen 17 7 - 30 mg/dL    Creatinine 0.98 0.66 - 1.25 mg/dL    GFR Estimate 77 >60 mL/min/1.7m2    GFR Estimate If Black >90 >60 mL/min/1.7m2    Calcium 9.4 8.5 - 10.1 mg/dL   LDL cholesterol direct   Result Value Ref Range    LDL Cholesterol Direct 95 <100 mg/dL   Hemoglobin   Result Value Ref Range    Hemoglobin 13.2 (L) 13.3 - 17.7 g/dL        Recent Labs   Lab Test  09/21/16   1542  01/06/16   1339  07/28/15   1535   HGB  13.5   --   13.0*   PLT  162   --   150   NA  137  139   --    POTASSIUM  4.1  4.6   --    CR  0.86  0.93   --       Mild anemia.  IMPRESSION:   Reason for surgery/procedure: see above  Diagnosis/reason for consult: see above    The proposed surgical procedure is 3considered LOW risk.    REVISED CARDIAC RISK INDEX  The patient has the following serious cardiovascular risks for perioperative complications such as (MI, PE, VFib and 3  AV Block):  No serious cardiac risks  INTERPRETATION: 1 risks: Class II (low risk - 0.9% complication rate)    The patient has the following additional risks for perioperative complications:  No identified additional risks      ICD-10-CM    1. Preop general physical exam Z01.818 BASIC METABOLIC PANEL     MELATONIN PO     Magnesium 400 MG CAPS     simvastatin (ZOCOR) 20 MG tablet     LDL cholesterol direct     Hemoglobin     EKG 12-lead complete w/read - Clinics   2. Decreased visual acuity H54.7    3. Lewy body dementia with behavioral disturbance G31.83     F02.81    4. Hyperlipidemia LDL goal <130 E78.5 BASIC METABOLIC PANEL     MELATONIN PO     Magnesium 400 MG CAPS     simvastatin  (ZOCOR) 20 MG tablet   5. Seasonal allergic rhinitis, unspecified chronicity, unspecified trigger J30.2 loratadine (CLARITIN) 10 MG tablet       RECOMMENDATIONS:       Patient Instructions   Please stop your aspirin starting 7 days before your surgery or procedure. You can restart it as soon as your surgeon tells you that it is OK to do so.   You may continue your other medications.    You may schedule a follow-up appointment soon, if you wish to discuss your chronic issues.      Before Your Surgery      Call your surgeon if there is any change in your health. This includes signs of a cold or flu (such as a sore throat, runny nose, cough, rash or fever).    Do not smoke, drink alcohol or take over the counter medicine (unless your surgeon or primary care doctor tells you to) for the 24 hours before and after surgery.    If you take prescribed drugs: Follow your doctor s orders about which medicines to take and which to stop until after surgery.    Eating and drinking prior to surgery: follow the instructions from your surgeon    Take a shower or bath the night before surgery. Use the soap your surgeon gave you to gently clean your skin. If you do not have soap from your surgeon, use your regular soap. Do not shave or scrub the surgery site.  Wear clean pajamas and have clean sheets on your bed.        APPROVAL GIVEN to proceed with proposed procedure, without further diagnostic evaluation       Signed Electronically by: Herminia Mathews MD    Copy of this evaluation report is provided to requesting physician.    [facility] Preop Guidelines    Revised Cardiac Risk Index

## 2018-04-24 NOTE — MR AVS SNAPSHOT
After Visit Summary   4/24/2018    Tanisha Albrecht    MRN: [id number]           Patient Information     Date Of Birth          1955        Visit Information        Provider Department      4/24/2018 1:40 PM Herminia Mathews MD [facility]        Today's Diagnoses     Preop general physical exam    -  1    Lewy body dementia with behavioral disturbance        Decreased visual acuity        Hyperlipidemia LDL goal <130        Seasonal allergic rhinitis, unspecified chronicity, unspecified trigger          Care Instructions    Please stop your aspirin starting 7 days before your surgery or procedure. You can restart it as soon as your surgeon tells you that it is OK to do so.   You may continue your other medications.    You may schedule a follow-up appointment soon, if you wish to discuss your chronic issues.      Before Your Surgery      Call your surgeon if there is any change in your health. This includes signs of a cold or flu (such as a sore throat, runny nose, cough, rash or fever).    Do not smoke, drink alcohol or take over the counter medicine (unless your surgeon or primary care doctor tells you to) for the 24 hours before and after surgery.    If you take prescribed drugs: Follow your doctor s orders about which medicines to take and which to stop until after surgery.    Eating and drinking prior to surgery: follow the instructions from your surgeon    Take a shower or bath the night before surgery. Use the soap your surgeon gave you to gently clean your skin. If you do not have soap from your surgeon, use your regular soap. Do not shave or scrub the surgery site.  Wear clean pajamas and have clean sheets on your bed.           Follow-ups after your visit        Who to contact     If you have questions or need follow up information about today's clinic visit or your schedule please contact [facility] directly at [phone].  Normal or non-critical  "lab and imaging results will be communicated to you by [employer], letter or phone within 4 business days after the clinic has received the results. If you do not hear from us within 7 days, please contact the clinic through [employer] or phone. If you have a critical or abnormal lab result, we will notify you by phone as soon as possible.  Submit refill requests through [employer] or call your pharmacy and they will forward the refill request to us. Please allow 3 business days for your refill to be completed.          Additional Information About Your Visit        [employer] Information     [employer] gives you secure access to your electronic health record. If you see a primary care provider, you can also send messages to your care team and make appointments. If you have questions, please call your primary care clinic.  If you do not have a primary care provider, please call [phone] and they will assist you.        Care EveryWhere ID     This is your Care EveryWhere ID. This could be used by other organizations to access your [facility] medical records  [id number]        Your Vitals Were     Pulse Temperature Respirations Height Pulse Oximetry BMI (Body Mass Index)    61 97.4  F (36.3  C) (Oral) 14 5' 5\" (1.651 m) 99% 21.8 kg/m2       Blood Pressure from Last 3 Encounters:   04/24/18 103/64   07/11/17 110/62   11/07/16 114/60    Weight from Last 3 Encounters:   04/24/18 131 lb (59.4 kg)   07/11/17 130 lb (59 kg)   09/21/16 127 lb (57.6 kg)              We Performed the Following     BASIC METABOLIC PANEL     EKG 12-lead complete w/read - Clinics     Hemoglobin     LDL cholesterol direct          Today's Medication Changes          These changes are accurate as of 4/24/18  2:22 PM.  If you have any questions, ask your nurse or doctor.               These medicines have changed or have updated prescriptions.        Dose/Directions    loratadine 10 MG tablet   Commonly known as:  CLARITIN   This may have changed:  See the new " instructions.   Used for:  Seasonal allergic rhinitis, unspecified chronicity, unspecified trigger   Changed by:  Herminia Mathews MD        Dose:  1 tablet   Take 1 tablet (10 mg) by mouth daily   Quantity:  90 tablet   Refills:  1       simvastatin 20 MG tablet   Commonly known as:  ZOCOR   This may have changed:  See the new instructions.   Used for:  Hyperlipidemia LDL goal <130, Preop general physical exam   Changed by:  Herminia Mathews MD        Dose:  20 mg   Take 1 tablet (20 mg) by mouth At Bedtime   Quantity:  90 tablet   Refills:  1            Where to get your medicines      These medications were sent to [facility] [address] [place] [address]  [address] [facility] [address]     Phone:  [phone]     loratadine 10 MG tablet    simvastatin 20 MG tablet                Primary Care Provider Office Phone # Fax #    Lovely Oconnell [phone] [address] [facility] [address]        Equal Access to Services     [facility] AH: Hadii aad ku hadasho Soomaali, waaxda luqadaha, qaybta kaalmada adeegyada, waxay austynin hayrajnin trisha nguyen . So [facility] [phone].    ATENCIÓN: Si habla español, tiene a robles disposición servicios gratuitos de asistencia lingüística. [facility] [phone].    We comply with applicable federal civil rights laws and [place] laws. We do not discriminate on the basis of race, color, national origin, age, disability, sex, sexual orientation, or gender identity.            Thank you!     Thank you for choosing [facility]  for your [facility]. Our goal is always to provide you with excellent care. Hearing back from our patients is one way we can continue to improve our services. Please take a few minutes to complete the written survey that you may receive in the mail after your visit with us. Thank you!             Your Updated Medication List - Protect others around you: Learn how  to safely use, store and throw away your medicines at www.disposemymeds.org.          This list is accurate as of 4/24/18  2:22 PM.  Always use your most recent med list.                   Brand Name Dispense Instructions for use Diagnosis    aspirin 81 MG tablet     90 tablet    Take 1 tablet (81 mg) by mouth daily    Hyperlipidemia LDL goal <130       citalopram 20 MG tablet    celeXA    90 tablet    TAKE 1 TABLET(20 MG) BY MOUTH DAILY    Anxiety       loratadine 10 MG tablet    CLARITIN    90 tablet    Take 1 tablet (10 mg) by mouth daily    Seasonal allergic rhinitis, unspecified chronicity, unspecified trigger       Magnesium 400 MG Caps       Preop general physical exam, Hyperlipidemia LDL goal <130       MELATONIN PO      Take 3 mg by mouth    Preop general physical exam, Hyperlipidemia LDL goal <130       MULTI VITAMIN MENS PO           omeprazole 20 MG CR capsule    priLOSEC    90 capsule    TAKE 1 CAPSULE BY MOUTH ONCE DAILY    Gastroesophageal reflux disease without esophagitis       polyethylene glycol powder    MIRALAX    510 g    Take 17 g (1 capful) by mouth daily    Chronic constipation       simvastatin 20 MG tablet    ZOCOR    90 tablet    Take 1 tablet (20 mg) by mouth At Bedtime    Hyperlipidemia LDL goal <130, Preop general physical exam       tamsulosin 0.4 MG capsule    FLOMAX    90 capsule    Take 1 capsule (0.4 mg) by mouth At Bedtime    Benign non-nodular prostatic hyperplasia with lower urinary tract symptoms

## 2018-04-27 NOTE — PROGRESS NOTES
Dear Tanisha,     Your test results are attached.    You have a mild anemia (slightly low hemoglobin) similar to your previous hemoglobin levels.  Your kidney function and blood electrolytes are within normal limits.     Please notify me via [employer] or contact the clinic at [phone] if you have any questions.    Herminia Mathews MD

## 2018-05-03 ENCOUNTER — TELEPHONE (OUTPATIENT)
Dept: FAMILY MEDICINE | Facility: CLINIC | Age: 63
End: 2018-05-03

## 2018-05-03 NOTE — TELEPHONE ENCOUNTER
Reason for Call:  Other     Detailed comments: fax over the pre op [phone]    Phone Number Patient can be reached at: Other phone number:      Best Time: any    Can we leave a detailed message on this number? Not Applicable    Call taken on 5/3/2018 at 1:06 PM by Solange Ortega

## 2018-05-15 ENCOUNTER — OFFICE VISIT (OUTPATIENT)
Dept: FAMILY MEDICINE | Facility: CLINIC | Age: 63
End: 2018-05-15
Payer: COMMERCIAL

## 2018-05-15 VITALS
TEMPERATURE: 99.2 F | OXYGEN SATURATION: 98 % | BODY MASS INDEX: 21.99 KG/M2 | DIASTOLIC BLOOD PRESSURE: 60 MMHG | SYSTOLIC BLOOD PRESSURE: 98 MMHG | HEART RATE: 67 BPM | HEIGHT: 65 IN | WEIGHT: 132 LBS

## 2018-05-15 DIAGNOSIS — J40 BRONCHITIS: Primary | ICD-10-CM

## 2018-05-15 DIAGNOSIS — G31.83 LEWY BODY DEMENTIA WITHOUT BEHAVIORAL DISTURBANCE (H): ICD-10-CM

## 2018-05-15 DIAGNOSIS — F02.80 LEWY BODY DEMENTIA WITHOUT BEHAVIORAL DISTURBANCE (H): ICD-10-CM

## 2018-05-15 PROCEDURE — 99214 OFFICE O/P EST MOD 30 MIN: CPT | Performed by: FAMILY MEDICINE

## 2018-05-15 RX ORDER — CEPHALEXIN 500 MG/1
500 CAPSULE ORAL 3 TIMES DAILY
Qty: 30 CAPSULE | Refills: 0 | Status: SHIPPED | OUTPATIENT
Start: 2018-05-15 | End: 2018-10-26

## 2018-05-15 NOTE — PROGRESS NOTES
CHIEF COMPLAINT    Cough for 3 days.      HISTORY    Cherie has been coughing for about 3 days.  He is not getting much sputum production.  Low-grade fever.    He has underlying medical problems.  He has Lewy body dementia.  He just had left eye surgery for retinal hemorrhage one week ago.    His wife is his main caregiver.    Patient is a smoker.      Patient Active Problem List   Diagnosis     GERD (gastroesophageal reflux disease)     Cigarette smoker     Anxiety     Hyperlipidemia LDL goal <130     Periodic limb movement sleep disorder     Advanced directives, counseling/discussion     Gallstones     History of depression     Disturbance in sleep behavior     Gastroesophageal reflux disease without esophagitis     Tubular adenoma of colon     Essential hypertension with goal blood pressure less than 140/90     Restless legs syndrome (RLS)     Hydronephrosis, unspecified hydronephrosis type     Male impotence     Lewy body dementia       Current Outpatient Prescriptions   Medication Sig Dispense Refill     aspirin 81 MG tablet Take 1 tablet (81 mg) by mouth daily 90 tablet 3     cephALEXin (KEFLEX) 500 MG capsule Take 1 capsule (500 mg) by mouth 3 times daily 30 capsule 0     citalopram (CELEXA) 20 MG tablet TAKE 1 TABLET(20 MG) BY MOUTH DAILY 90 tablet 1     loratadine (CLARITIN) 10 MG tablet Take 1 tablet (10 mg) by mouth daily 90 tablet 1     Magnesium 400 MG CAPS        MELATONIN PO Take 3 mg by mouth       MULTI VITAMIN MENS PO        omeprazole (PRILOSEC) 20 MG CR capsule TAKE 1 CAPSULE BY MOUTH ONCE DAILY 90 capsule 3     polyethylene glycol (MIRALAX) powder Take 17 g (1 capful) by mouth daily 510 g 1     simvastatin (ZOCOR) 20 MG tablet Take 1 tablet (20 mg) by mouth At Bedtime 90 tablet 1     tamsulosin (FLOMAX) 0.4 MG capsule Take 1 capsule (0.4 mg) by mouth At Bedtime 90 capsule 3       REVIEW OF SYSTEMS    Weight stable.  No chest pain.  No abdominal pain or vomiting.  No edema.  No focal  "weakness.        Past Medical History:   Diagnosis Date     Anxiety      Bronchospasm      Gallstones      GERD (gastroesophageal reflux disease)      HTN (hypertension)      Hyperlipidemia LDL goal <130      Mild major depression (H)      Periodic limb movement sleep disorder        EXAM  BP 98/60  Pulse 67  Temp 99.2  F (37.3  C) (Oral)  Ht 5' 5\" (1.651 m)  Wt 132 lb (59.9 kg)  SpO2 98%  BMI 21.97 kg/m2    Thin man, not in distress.  Alert.  HEENT.  Some redness left conjunctiva.  Pharynx unremarkable.  Neck.  No adenopathy or mass.  Chest has occasional rhonchi.  He is moving air fairly well.  Cardiac, pulse regular.  Extremities no edema.  Neurological.  Slightly lethargic.  Chart steps taken when he attempts to walk.  No tremor.      (J40) Bronchitis  (primary encounter diagnosis)  Comment:     I will start some treatment.  Antibiotic.  Avoid cough suppressant as his neurological condition may limit coughing ability also.  Observe and have follow-up if symptoms worsen or if he does not improve.  Care discussed with his wife.    Plan: cephALEXin (KEFLEX) 500 MG capsule            (G31.83,  F02.80) Lewy body dementia without behavioral disturbance  Comment: A factor in less than optimal pulmonary function.  Plan:         "

## 2018-05-15 NOTE — MR AVS SNAPSHOT
"              After Visit Summary   5/15/2018    Tanisha Albrecht    MRN: [id number]           Patient Information     Date Of Birth          1955        Visit Information        Provider Department      5/15/2018 2:20 PM Vasquez Morgan MD [facility]        Today's Diagnoses     Bronchitis    -  1    Lewy body dementia without behavioral disturbance          Care Instructions      Take prescribed medication as directed.    Cough med such as Mucinex.           Follow-ups after your visit        Who to contact     If you have questions or need follow up information about today's clinic visit or your schedule please contact [facility] directly at [phone].  Normal or non-critical lab and imaging results will be communicated to you by Mindjethart, letter or phone within 4 business days after the clinic has received the results. If you do not hear from us within 7 days, please contact the clinic through Mindjethart or phone. If you have a critical or abnormal lab result, we will notify you by phone as soon as possible.  Submit refill requests through Chartboost or call your pharmacy and they will forward the refill request to us. Please allow 3 business days for your refill to be completed.          Additional Information About Your Visit        MyChart Information     Chartboost gives you secure access to your electronic health record. If you see a primary care provider, you can also send messages to your care team and make appointments. If you have questions, please call your primary care clinic.  If you do not have a primary care provider, please call [phone] and they will assist you.        Care EveryWhere ID     This is your Care EveryWhere ID. This could be used by other organizations to access your [facility] medical records  [id number]        Your Vitals Were     Pulse Temperature Height Pulse Oximetry BMI (Body Mass Index)       67 99.2  F (37.3  C) (Oral) 5' 5\" (1.651 m) 98% 21.97 " kg/m2        Blood Pressure from Last 3 Encounters:   05/15/18 98/60   04/24/18 103/64   07/11/17 110/62    Weight from Last 3 Encounters:   05/15/18 132 lb (59.9 kg)   04/24/18 131 lb (59.4 kg)   07/11/17 130 lb (59 kg)              Today, you had the following     No orders found for display         Today's Medication Changes          These changes are accurate as of 5/15/18  2:51 PM.  If you have any questions, ask your nurse or doctor.               Start taking these medicines.        Dose/Directions    cephALEXin 500 MG capsule   Commonly known as:  KEFLEX   Used for:  Bronchitis   Started by:  Vasquez Morgan MD        Dose:  500 mg   Take 1 capsule (500 mg) by mouth 3 times daily   Quantity:  30 capsule   Refills:  0            Where to get your medicines      These medications were sent to [facility] [address] [place] [address] [address] [address]     Phone:  [phone]     cephALEXin 500 MG capsule                Primary Care Provider Office Phone # Fax #    Lovely Oconnell [phone]       [address] [facility] [address]        Equal Access to Services     [place] JERRY AH: Hadii russell ku hadasho Soomaali, waaxda luqadaha, qaybta kaalmada adeegyada, waxay jacky hayrajnin trisha aponte. So [facility] [phone].    ATENCIÓN: Si habla español, tiene a robles disposición servicios gratuitos de asistencia lingüística. Camille al [phone].    We comply with applicable federal civil rights laws and [place] laws. We do not discriminate on the basis of race, color, national origin, age, disability, sex, sexual orientation, or gender identity.            Thank you!     Thank you for choosing [facility]  [facility]. Our goal is always to provide you with excellent care. Hearing back from our patients is one way we can continue to improve our services. Please take a few minutes to  complete the written survey that you may receive in the mail after your visit with us. Thank you!             Your Updated Medication List - Protect others around you: Learn how to safely use, store and throw away your medicines at www.disposemymeds.org.          This list is accurate as of 5/15/18  2:51 PM.  Always use your most recent med list.                   Brand Name Dispense Instructions for use Diagnosis    aspirin 81 MG tablet     90 tablet    Take 1 tablet (81 mg) by mouth daily    Hyperlipidemia LDL goal <130       cephALEXin 500 MG capsule    KEFLEX    30 capsule    Take 1 capsule (500 mg) by mouth 3 times daily    Bronchitis       citalopram 20 MG tablet    celeXA    90 tablet    TAKE 1 TABLET(20 MG) BY MOUTH DAILY    Anxiety       loratadine 10 MG tablet    CLARITIN    90 tablet    Take 1 tablet (10 mg) by mouth daily    Seasonal allergic rhinitis, unspecified chronicity, unspecified trigger       Magnesium 400 MG Caps       Preop general physical exam, Hyperlipidemia LDL goal <130       MELATONIN PO      Take 3 mg by mouth    Preop general physical exam, Hyperlipidemia LDL goal <130       MULTI VITAMIN MENS PO           omeprazole 20 MG CR capsule    priLOSEC    90 capsule    TAKE 1 CAPSULE BY MOUTH ONCE DAILY    Gastroesophageal reflux disease without esophagitis       polyethylene glycol powder    MIRALAX    510 g    Take 17 g (1 capful) by mouth daily    Chronic constipation       simvastatin 20 MG tablet    ZOCOR    90 tablet    Take 1 tablet (20 mg) by mouth At Bedtime    Hyperlipidemia LDL goal <130, Preop general physical exam       tamsulosin 0.4 MG capsule    FLOMAX    90 capsule    Take 1 capsule (0.4 mg) by mouth At Bedtime    Benign non-nodular prostatic hyperplasia with lower urinary tract symptoms

## 2018-05-15 NOTE — PROGRESS NOTES
4 days of coughing all day/night, nose running, fatigue, fever, make sure not an infection from surgery. Coughing up green/yellow stuff.

## 2018-05-29 ENCOUNTER — TRANSFERRED RECORDS (OUTPATIENT)
Dept: HEALTH INFORMATION MANAGEMENT | Facility: CLINIC | Age: 63
End: 2018-05-29

## 2018-10-03 DIAGNOSIS — E78.5 HYPERLIPIDEMIA LDL GOAL <130: ICD-10-CM

## 2018-10-03 DIAGNOSIS — Z01.818 PREOP GENERAL PHYSICAL EXAM: ICD-10-CM

## 2018-10-04 RX ORDER — SIMVASTATIN 20 MG
TABLET ORAL
Qty: 90 TABLET | Refills: 0 | Status: SHIPPED | OUTPATIENT
Start: 2018-10-04 | End: 2018-10-26

## 2018-10-05 ENCOUNTER — RECORDS - HEALTHEAST (OUTPATIENT)
Dept: LAB | Facility: CLINIC | Age: 63
End: 2018-10-05

## 2018-10-05 LAB
BASOPHILS # BLD AUTO: 0 THOU/UL (ref 0–0.2)
BASOPHILS NFR BLD AUTO: 1 % (ref 0–2)
EOSINOPHIL # BLD AUTO: 0.1 THOU/UL (ref 0–0.4)
EOSINOPHIL NFR BLD AUTO: 2 % (ref 0–6)
ERYTHROCYTE [DISTWIDTH] IN BLOOD BY AUTOMATED COUNT: 14.2 % (ref 11–14.5)
HCT VFR BLD AUTO: 37.1 % (ref 40–54)
HGB BLD-MCNC: 12 G/DL (ref 14–18)
LYMPHOCYTES # BLD AUTO: 0.7 THOU/UL (ref 0.8–4.4)
LYMPHOCYTES NFR BLD AUTO: 12 % (ref 20–40)
MCH RBC QN AUTO: 29.3 PG (ref 27–34)
MCHC RBC AUTO-ENTMCNC: 32.3 G/DL (ref 32–36)
MCV RBC AUTO: 91 FL (ref 80–100)
MONOCYTES # BLD AUTO: 0.5 THOU/UL (ref 0–0.9)
MONOCYTES NFR BLD AUTO: 9 % (ref 2–10)
NEUTROPHILS # BLD AUTO: 4.6 THOU/UL (ref 2–7.7)
NEUTROPHILS NFR BLD AUTO: 77 % (ref 50–70)
PLATELET # BLD AUTO: 159 THOU/UL (ref 140–440)
PMV BLD AUTO: 10.8 FL (ref 8.5–12.5)
RBC # BLD AUTO: 4.1 MILL/UL (ref 4.4–6.2)
WBC: 6 THOU/UL (ref 4–11)

## 2018-10-09 ENCOUNTER — RECORDS - HEALTHEAST (OUTPATIENT)
Dept: LAB | Facility: CLINIC | Age: 63
End: 2018-10-09

## 2018-10-09 LAB
ALBUMIN UR-MCNC: NEGATIVE MG/DL
APPEARANCE UR: CLEAR
BILIRUB UR QL STRIP: NEGATIVE
COLOR UR AUTO: YELLOW
GLUCOSE UR STRIP-MCNC: NEGATIVE MG/DL
HGB UR QL STRIP: NEGATIVE
KETONES UR STRIP-MCNC: NEGATIVE MG/DL
LEUKOCYTE ESTERASE UR QL STRIP: NEGATIVE
NITRATE UR QL: NEGATIVE
PH UR STRIP: 7.5 [PH] (ref 4.5–8)
SP GR UR STRIP: 1.01 (ref 1–1.03)
UROBILINOGEN UR STRIP-ACNC: ABNORMAL

## 2018-10-10 LAB — BACTERIA SPEC CULT: NO GROWTH

## 2018-10-19 ENCOUNTER — RECORDS - HEALTHEAST (OUTPATIENT)
Dept: LAB | Facility: CLINIC | Age: 63
End: 2018-10-19

## 2018-10-19 LAB
ALBUMIN UR-MCNC: NEGATIVE MG/DL
ALBUMIN UR-MCNC: NEGATIVE MG/DL
APPEARANCE UR: CLEAR
APPEARANCE UR: CLEAR
BILIRUB UR QL STRIP: ABNORMAL
BILIRUB UR QL STRIP: ABNORMAL
COLOR UR AUTO: YELLOW
COLOR UR AUTO: YELLOW
GLUCOSE UR STRIP-MCNC: NEGATIVE MG/DL
GLUCOSE UR STRIP-MCNC: NEGATIVE MG/DL
HGB UR QL STRIP: NEGATIVE
HGB UR QL STRIP: NEGATIVE
KETONES UR STRIP-MCNC: NEGATIVE MG/DL
KETONES UR STRIP-MCNC: NEGATIVE MG/DL
LEUKOCYTE ESTERASE UR QL STRIP: NEGATIVE
LEUKOCYTE ESTERASE UR QL STRIP: NEGATIVE
NITRATE UR QL: NEGATIVE
NITRATE UR QL: NEGATIVE
PH UR STRIP: 6 [PH] (ref 4.5–8)
PH UR STRIP: 6.5 [PH] (ref 4.5–8)
SP GR UR STRIP: 1.01 (ref 1–1.03)
SP GR UR STRIP: 1.01 (ref 1–1.03)
UROBILINOGEN UR STRIP-ACNC: ABNORMAL
UROBILINOGEN UR STRIP-ACNC: ABNORMAL

## 2018-10-20 LAB
BACTERIA SPEC CULT: NO GROWTH
BACTERIA SPEC CULT: NO GROWTH

## 2018-10-26 ENCOUNTER — RECORDS - HEALTHEAST (OUTPATIENT)
Dept: LAB | Facility: CLINIC | Age: 63
End: 2018-10-26

## 2018-10-26 ENCOUNTER — APPOINTMENT (OUTPATIENT)
Dept: CT IMAGING | Facility: CLINIC | Age: 63
End: 2018-10-26
Attending: FAMILY MEDICINE
Payer: COMMERCIAL

## 2018-10-26 ENCOUNTER — HOSPITAL ENCOUNTER (EMERGENCY)
Facility: CLINIC | Age: 63
Discharge: ANOTHER HOSPITAL | End: 2018-10-26
Attending: FAMILY MEDICINE | Admitting: FAMILY MEDICINE
Payer: COMMERCIAL

## 2018-10-26 VITALS
BODY MASS INDEX: 22.5 KG/M2 | HEART RATE: 59 BPM | DIASTOLIC BLOOD PRESSURE: 73 MMHG | OXYGEN SATURATION: 98 % | WEIGHT: 140 LBS | SYSTOLIC BLOOD PRESSURE: 113 MMHG | RESPIRATION RATE: 16 BRPM | HEIGHT: 66 IN | TEMPERATURE: 99.6 F

## 2018-10-26 DIAGNOSIS — K80.50: ICD-10-CM

## 2018-10-26 DIAGNOSIS — K83.1 BILIARY OBSTRUCTION (H): ICD-10-CM

## 2018-10-26 LAB
ALBUMIN SERPL-MCNC: 3 G/DL (ref 3.4–5)
ALBUMIN SERPL-MCNC: 3.3 G/DL (ref 3.5–5)
ALP SERPL-CCNC: 1110 U/L (ref 45–120)
ALP SERPL-CCNC: 996 U/L (ref 40–150)
ALT SERPL W P-5'-P-CCNC: 235 U/L (ref 0–70)
ALT SERPL W P-5'-P-CCNC: 249 U/L (ref 0–45)
AMMONIA PLAS-SCNC: 22 UMOL/L (ref 10–50)
ANION GAP SERPL CALCULATED.3IONS-SCNC: 6 MMOL/L (ref 5–18)
ANION GAP SERPL CALCULATED.3IONS-SCNC: 7 MMOL/L (ref 3–14)
AST SERPL W P-5'-P-CCNC: 211 U/L (ref 0–45)
AST SERPL W P-5'-P-CCNC: 227 U/L (ref 0–40)
BASOPHILS # BLD AUTO: 0.1 10E9/L (ref 0–0.2)
BASOPHILS NFR BLD AUTO: 0.8 %
BILIRUB SERPL-MCNC: 7.4 MG/DL (ref 0.2–1.3)
BILIRUB SERPL-MCNC: 8.8 MG/DL (ref 0–1)
BUN SERPL-MCNC: 11 MG/DL (ref 8–22)
BUN SERPL-MCNC: 13 MG/DL (ref 7–30)
CALCIUM SERPL-MCNC: 8.3 MG/DL (ref 8.5–10.1)
CALCIUM SERPL-MCNC: 9.5 MG/DL (ref 8.5–10.5)
CHLORIDE BLD-SCNC: 100 MMOL/L (ref 98–107)
CHLORIDE SERPL-SCNC: 103 MMOL/L (ref 94–109)
CO2 SERPL-SCNC: 27 MMOL/L (ref 20–32)
CO2 SERPL-SCNC: 31 MMOL/L (ref 22–31)
CREAT SERPL-MCNC: 0.88 MG/DL (ref 0.66–1.25)
CREAT SERPL-MCNC: 1.02 MG/DL (ref 0.7–1.3)
DIFFERENTIAL METHOD BLD: ABNORMAL
EOSINOPHIL # BLD AUTO: 0.4 10E9/L (ref 0–0.7)
EOSINOPHIL NFR BLD AUTO: 6.8 %
ERYTHROCYTE [DISTWIDTH] IN BLOOD BY AUTOMATED COUNT: 14.1 % (ref 10–15)
GFR SERPL CREATININE-BSD FRML MDRD: 88 ML/MIN/1.7M2
GFR SERPL CREATININE-BSD FRML MDRD: >60 ML/MIN/1.73M2
GLUCOSE BLD-MCNC: 91 MG/DL (ref 70–125)
GLUCOSE SERPL-MCNC: 130 MG/DL (ref 70–99)
HCT VFR BLD AUTO: 37.5 % (ref 40–53)
HGB BLD-MCNC: 12.2 G/DL (ref 13.3–17.7)
IMM GRANULOCYTES # BLD: 0.1 10E9/L (ref 0–0.4)
IMM GRANULOCYTES NFR BLD: 1 %
LIPASE SERPL-CCNC: 197 U/L (ref 73–393)
LYMPHOCYTES # BLD AUTO: 0.7 10E9/L (ref 0.8–5.3)
LYMPHOCYTES NFR BLD AUTO: 10.6 %
MCH RBC QN AUTO: 29.2 PG (ref 26.5–33)
MCHC RBC AUTO-ENTMCNC: 32.5 G/DL (ref 31.5–36.5)
MCV RBC AUTO: 90 FL (ref 78–100)
MONOCYTES # BLD AUTO: 0.7 10E9/L (ref 0–1.3)
MONOCYTES NFR BLD AUTO: 12.1 %
NEUTROPHILS # BLD AUTO: 4.2 10E9/L (ref 1.6–8.3)
NEUTROPHILS NFR BLD AUTO: 68.7 %
NRBC # BLD AUTO: 0 10*3/UL
NRBC BLD AUTO-RTO: 0 /100
PLATELET # BLD AUTO: 262 10E9/L (ref 150–450)
POTASSIUM BLD-SCNC: 4.4 MMOL/L (ref 3.5–5)
POTASSIUM SERPL-SCNC: 4.1 MMOL/L (ref 3.4–5.3)
PROT SERPL-MCNC: 7 G/DL (ref 6.8–8.8)
PROT SERPL-MCNC: 7 G/DL (ref 6–8)
RBC # BLD AUTO: 4.18 10E12/L (ref 4.4–5.9)
SODIUM SERPL-SCNC: 137 MMOL/L (ref 133–144)
SODIUM SERPL-SCNC: 137 MMOL/L (ref 136–145)
WBC # BLD AUTO: 6.1 10E9/L (ref 4–11)

## 2018-10-26 PROCEDURE — 85025 COMPLETE CBC W/AUTO DIFF WBC: CPT | Performed by: FAMILY MEDICINE

## 2018-10-26 PROCEDURE — 99285 EMERGENCY DEPT VISIT HI MDM: CPT | Mod: Z6 | Performed by: FAMILY MEDICINE

## 2018-10-26 PROCEDURE — 83690 ASSAY OF LIPASE: CPT | Performed by: FAMILY MEDICINE

## 2018-10-26 PROCEDURE — 25000128 H RX IP 250 OP 636: Performed by: FAMILY MEDICINE

## 2018-10-26 PROCEDURE — 36415 COLL VENOUS BLD VENIPUNCTURE: CPT | Performed by: FAMILY MEDICINE

## 2018-10-26 PROCEDURE — 96365 THER/PROPH/DIAG IV INF INIT: CPT | Mod: 59 | Performed by: FAMILY MEDICINE

## 2018-10-26 PROCEDURE — 80053 COMPREHEN METABOLIC PANEL: CPT | Performed by: FAMILY MEDICINE

## 2018-10-26 PROCEDURE — 87040 BLOOD CULTURE FOR BACTERIA: CPT | Performed by: FAMILY MEDICINE

## 2018-10-26 PROCEDURE — 25000125 ZZHC RX 250: Performed by: FAMILY MEDICINE

## 2018-10-26 PROCEDURE — 82140 ASSAY OF AMMONIA: CPT | Performed by: FAMILY MEDICINE

## 2018-10-26 PROCEDURE — 99285 EMERGENCY DEPT VISIT HI MDM: CPT | Mod: 25 | Performed by: FAMILY MEDICINE

## 2018-10-26 PROCEDURE — 74177 CT ABD & PELVIS W/CONTRAST: CPT

## 2018-10-26 RX ORDER — IOPAMIDOL 755 MG/ML
68 INJECTION, SOLUTION INTRAVASCULAR ONCE
Status: COMPLETED | OUTPATIENT
Start: 2018-10-26 | End: 2018-10-26

## 2018-10-26 RX ORDER — AMPICILLIN AND SULBACTAM 2; 1 G/1; G/1
3 INJECTION, POWDER, FOR SOLUTION INTRAMUSCULAR; INTRAVENOUS ONCE
Status: COMPLETED | OUTPATIENT
Start: 2018-10-26 | End: 2018-10-26

## 2018-10-26 RX ORDER — ASPIRIN 81 MG/1
81 TABLET, CHEWABLE ORAL DAILY
COMMUNITY

## 2018-10-26 RX ORDER — DONEPEZIL HYDROCHLORIDE 10 MG/1
1 TABLET, FILM COATED ORAL AT BEDTIME
COMMUNITY
Start: 2018-09-16

## 2018-10-26 RX ORDER — ATORVASTATIN CALCIUM 10 MG/1
1 TABLET, FILM COATED ORAL EVERY MORNING
COMMUNITY
Start: 2018-09-25

## 2018-10-26 RX ORDER — CALCIUM CARBONATE/VITAMIN D3 500-10/5ML
400 LIQUID (ML) ORAL DAILY
COMMUNITY

## 2018-10-26 RX ORDER — LATANOPROST 50 UG/ML
1 SOLUTION/ DROPS OPHTHALMIC AT BEDTIME
COMMUNITY
Start: 2018-09-21

## 2018-10-26 RX ADMIN — IOPAMIDOL 68 ML: 755 INJECTION, SOLUTION INTRAVENOUS at 18:28

## 2018-10-26 RX ADMIN — AMPICILLIN SODIUM AND SULBACTAM SODIUM 3 G: 2; 1 INJECTION, POWDER, FOR SOLUTION INTRAMUSCULAR; INTRAVENOUS at 21:08

## 2018-10-26 RX ADMIN — SODIUM CHLORIDE 57 ML: 9 INJECTION, SOLUTION INTRAVENOUS at 18:29

## 2018-10-26 ASSESSMENT — MIFFLIN-ST. JEOR
SCORE: 1283.86
SCORE: 1283.86

## 2018-10-26 NOTE — ED PROVIDER NOTES
History     Chief Complaint   Patient presents with     Abnormal Labs     Pt appears jaundiced and appears here today with wife - c/o bilat lower abdominal pain     HPI    Tanisha Albrecht is a 63 year old male who was brought in by paramedics from [facility] at the request of his wife because he has been failing for the past couple of weeks.  He was diagnosed with Lewy body dementia in the last year and ultimately ended up in a nursing home at [place] in 2 weeks ago at [facility].  In part this was due to his wife's medical problems requiring her own transitional care rehabilitation after some revascularization for peripheral vascular disease and other health problems.  She states that he seems weaker and has also noticed that his urine has been orange.  They had lunch with a friend of theirs who said that he appeared to be jaundiced and advised that he be seen.  Apparently they saw a physician at the [facility] care unit a few days ago but the results of that evaluation are unknown.  Today he had lab studies showing an elevated bilirubin and alkaline phosphatase.  Patient reports that he has been having pains in the left side of his abdomen in the upper quadrant for the past week and a half.  These come and go.  His wife thinks he has had fevers up to 102 last one measured a week ago but she is uncertain because these are measured by the [facility] care staff.  Patient denies any difficulty breathing or headache.  He denies chest pain.  He has reported some occasional dysuria but not urgency or frequency.  His wife thinks his bowel movements have been softer and lighter color than previously; she has not noticed blood or melena.    Problem List:    Patient Active Problem List    Diagnosis Date Noted     Lewy body dementia 04/24/2018     Priority: Medium     Male impotence 11/07/2016     Priority: Medium     Hydronephrosis, unspecified hydronephrosis type 09/27/2016     Priority: Medium      Essential hypertension with goal blood pressure less than 140/90 09/21/2016     Priority: Medium     Restless legs syndrome (RLS) 09/21/2016     Priority: Medium     Tubular adenoma of colon 02/16/2016     Priority: Medium     Gastroesophageal reflux disease without esophagitis 01/05/2016     Priority: Medium     Disturbance in sleep behavior 11/25/2014     Priority: Medium     Problem list name updated by automated process. Provider to review       History of depression 11/20/2013     Priority: Medium     Advanced directives, counseling/discussion 02/23/2012     Priority: Medium     Advance Directive Problem List Overview:   Name Relationship Phone    Primary Health Care Agent            Alternative Health Care Agent          Discussed advance care planning with patient; information given to patient to review. 2/23/2012          Gallstones      Priority: Medium     Periodic limb movement sleep disorder      Priority: Medium     Hyperlipidemia LDL goal <130      Priority: Medium     Anxiety      Priority: Medium     GERD (gastroesophageal reflux disease)      Priority: Medium     Cigarette smoker      Priority: Medium        Past Medical History:    Past Medical History:   Diagnosis Date     Anxiety      Bronchospasm      Gallstones      GERD (gastroesophageal reflux disease)      HTN (hypertension)      Hyperlipidemia LDL goal <130      Mild major depression (H)      Periodic limb movement sleep disorder        Past Surgical History:    No past surgical history on file.    Family History:    Family History   Problem Relation Age of Onset     HEART DISEASE Mother      Alzheimer Disease Father      Cancer Father      Diabetes Father      Cancer Brother      pancreas     Cancer Other      melanoma       Social History:  Marital Status:   [2]  Social History   Substance Use Topics     Smoking status: Current Every Day Smoker     Packs/day: 0.50     Years: 25.00     Types: Cigarettes     Smokeless tobacco: Former  "User     Types: Chew     Alcohol use 0.0 oz/week     0 Standard drinks or equivalent per week      Comment: Occasionally        Medications:      aspirin 81 MG chewable tablet   atorvastatin (LIPITOR) 10 MG tablet   citalopram (CELEXA) 20 MG tablet   donepezil (ARICEPT) 10 MG tablet   latanoprost (XALATAN) 0.005 % ophthalmic solution   loratadine (CLARITIN) 10 MG tablet   magnesium oxide 400 MG CAPS   MELATONIN PO   MULTI VITAMIN MENS PO   omeprazole (PRILOSEC) 20 MG CR capsule   polyethylene glycol (MIRALAX) powder   tamsulosin (FLOMAX) 0.4 MG capsule     Review of Systems    All other systems are reviewed and are negative    Physical Exam   BP: 113/73  Pulse: 59  Temp: 99.6  F (37.6  C)  Resp: 16  Height: 167.6 cm (5' 6\")  Weight: 63.5 kg (140 lb)  SpO2: 96 %    Physical Exam    Nursing note and vitals were reviewed.  Constitutional: Awake and alert, but psychomotor retarded malnourished and jaundiced appearing 63-year-old in no apparent discomfort, who does not appear acutely ill, and who answers questions slowly and with minimal responses but apparently appropriately and cooperates with examination.  HEENT: Sclerae are anicteric.  Conjunctiva are otherwise intact.  PERRLA.  EOMI.   Neck: Freely mobile.  Cardiovascular: Cardiac examination reveals normal heart rate and regular rhythm without murmur.  Pulmonary/Chest: Breathing is unlabored.  Breath sounds are clear and equal bilaterally.  There no retractions, tachypnea, rales, wheezes, or rhonchi.  Abdomen: Soft, difficult to assess, appears to have mild diffuse tenderness but this may just be guarding related to his dementia, no definite HSM or massesor rebound.  Musculoskeletal: Extremities are warm and well-perfused and without edema  Neurological: Alert, oriented, thought content logical, coherent   Skin: Warm, dry, no rashes.  Psychiatric: Affect broad and appropriate.      ED Course     ED Course     Procedures               Critical Care time:  none      "          Results for orders placed or performed during the hospital encounter of 10/26/18 (from the past 24 hour(s))   CBC with platelets differential   Result Value Ref Range    WBC 6.1 4.0 - 11.0 10e9/L    RBC Count 4.18 (L) 4.4 - 5.9 10e12/L    Hemoglobin 12.2 (L) 13.3 - 17.7 g/dL    Hematocrit 37.5 (L) 40.0 - 53.0 %    MCV 90 78 - 100 fl    MCH 29.2 26.5 - 33.0 pg    MCHC 32.5 31.5 - 36.5 g/dL    RDW 14.1 10.0 - 15.0 %    Platelet Count 262 150 - 450 10e9/L    Diff Method Automated Method     % Neutrophils 68.7 %    % Lymphocytes 10.6 %    % Monocytes 12.1 %    % Eosinophils 6.8 %    % Basophils 0.8 %    % Immature Granulocytes 1.0 %    Nucleated RBCs 0 0 /100    Absolute Neutrophil 4.2 1.6 - 8.3 10e9/L    Absolute Lymphocytes 0.7 (L) 0.8 - 5.3 10e9/L    Absolute Monocytes 0.7 0.0 - 1.3 10e9/L    Absolute Eosinophils 0.4 0.0 - 0.7 10e9/L    Absolute Basophils 0.1 0.0 - 0.2 10e9/L    Abs Immature Granulocytes 0.1 0 - 0.4 10e9/L    Absolute Nucleated RBC 0.0    Comprehensive metabolic panel   Result Value Ref Range    Sodium 137 133 - 144 mmol/L    Potassium 4.1 3.4 - 5.3 mmol/L    Chloride 103 94 - 109 mmol/L    Carbon Dioxide 27 20 - 32 mmol/L    Anion Gap 7 3 - 14 mmol/L    Glucose 130 (H) 70 - 99 mg/dL    Urea Nitrogen 13 7 - 30 mg/dL    Creatinine 0.88 0.66 - 1.25 mg/dL    GFR Estimate 88 >60 mL/min/1.7m2    GFR Estimate If Black >90 >60 mL/min/1.7m2    Calcium 8.3 (L) 8.5 - 10.1 mg/dL    Bilirubin Total 7.4 (H) 0.2 - 1.3 mg/dL    Albumin 3.0 (L) 3.4 - 5.0 g/dL    Protein Total 7.0 6.8 - 8.8 g/dL    Alkaline Phosphatase 996 (H) 40 - 150 U/L     (H) 0 - 70 U/L     (H) 0 - 45 U/L   Lipase   Result Value Ref Range    Lipase 197 73 - 393 U/L   Ammonia (on ice)   Result Value Ref Range    Ammonia 22 10 - 50 umol/L   CT Abdomen Pelvis w Contrast    Narrative    CT ABDOMEN AND PELVIS WITH CONTRAST   10/26/2018  6:40 PM     HISTORY: Hepatobiliary obstruction.    TECHNIQUE: 68 mL Isovue -370. CT images  of the abdomen and pelvis  following nonionic intravenous contrast. Radiation dose for this scan  was reduced using automated exposure control, adjustment of the mA  and/or kV according to patient size, or iterative reconstruction  technique.    COMPARISON: 9/26/2016    FINDINGS:   There is intrahepatic biliary ductal dilation. There is a dense  calcification projected over the expected location of the common  hepatic duct that measures 7 mm (series 2, image 22). The gallbladder  is decompressed. The common hepatic duct appears markedly thickened  around this calcific density. No evidence of hepatic mass.    The pancreas, spleen, adrenal glands, and kidneys appear normal. No  abnormal bowel distention, free air, or ascites. There is a large  amount of retained colonic stool. No abdominal or retroperitoneal  lymphadenopathy. The appendix is normal. No pelvic adenopathy.  Benign-appearing sclerosis in the right iliac is unchanged compared to  the prior study. Calcified granuloma noted at the right lung base.      Impression    IMPRESSION: Intrahepatic biliary ductal dilation appears to be related  to a common hepatic duct calcified stone with surrounding  inflammation.    EMILY VASQUEZ MD   Blood culture   Result Value Ref Range    Specimen Description Blood Right Arm     Special Requests Aerobic and anaerobic bottles received     Culture Micro PENDING    Blood culture   Result Value Ref Range    Specimen Description Blood Left Arm     Special Requests Aerobic and anaerobic bottles received     Culture Micro PENDING        Medications   iopamidol (ISOVUE-370) solution 68 mL (68 mLs Intravenous Given 10/26/18 1828)   Saline Flush (57 mLs Intravenous Given 10/26/18 1829)   ampicillin-sulbactam (UNASYN) 3 g vial to attach to  mL bag (0 g Intravenous ED Infusing on Admission/transfer 10/26/18 7409)       Assessments & Plan (with Medical Decision Making)     63-year-old male with Lewy body dementia living in a  memory care unit was brought in by his wife because of 10 days of intermittent abdominal pain, fevers, and the development today of jaundice.  Workup in the emergency department identified biliary tract obstruction in the liver.  This was felt most likely to represent a stone by the radiologist.  I discussed the case with the surgeon, Dr. Le who recommended transfer to a facility with a gastroenterologist for possible, ERCP and GI consultation.  I discussed the recommendations with the patient and his family who were in agreement with the plan.  We made arrangements for transfer to [facility] and I spoke to Dr. Arredondo the hospitalist who agreed to accept him in transfer pending approval by the gastroenterologist.  I spoke with Dr. Hoff on call about for gastroenterology who said they would be able to consult on him and perform appropriate procedures if transfer to [facility].  Patient remained stable during the remainder of times in the emergency department.    Given the high-grade obstruction of the biliary tract, I initiated antibiotic therapy with Unasyn because of his risk for cholangitis.  I obtain blood cultures in advance at the request of Dr. Arredondo.    I have reviewed the nursing notes.    I have reviewed the findings, diagnosis, plan and need for follow up with the patient.       Discharge Medication List as of 10/26/2018  9:36 PM          Final diagnoses:   Intrahepatic calculus   Biliary obstruction       10/26/2018   [facility] EMERGENCY DEPARTMENT     Robert Syed MD  10/27/18 0048

## 2018-10-26 NOTE — ED NOTES
Pt has Lewey dementia and has been in NH for a couple of months - patient has been 'declining' per wife over last couple of weeks - states they went out for lunch and a friend noticed how yellow he was and felt he should be seen. Patient made appointment to be seen in clinic and lab tests were abnormal which is why they transferred to ED. Patient c/o bilat lower inguinal discomfort - no precipitating or alleviating factors. States stool has been loose and orange in color. Denies any vomiting although admits to nausea.

## 2018-10-27 ENCOUNTER — SURGERY - HEALTHEAST (OUTPATIENT)
Dept: SURGERY | Facility: HOSPITAL | Age: 63
End: 2018-10-27

## 2018-10-27 ENCOUNTER — ANESTHESIA - HEALTHEAST (OUTPATIENT)
Dept: SURGERY | Facility: HOSPITAL | Age: 63
End: 2018-10-27

## 2018-10-28 ENCOUNTER — SURGERY - HEALTHEAST (OUTPATIENT)
Dept: SURGERY | Facility: HOSPITAL | Age: 63
End: 2018-10-28

## 2018-10-28 ENCOUNTER — HOME CARE/HOSPICE - HEALTHEAST (OUTPATIENT)
Dept: HOME HEALTH SERVICES | Facility: HOME HEALTH | Age: 63
End: 2018-10-28

## 2018-10-28 ENCOUNTER — ANESTHESIA - HEALTHEAST (OUTPATIENT)
Dept: SURGERY | Facility: HOSPITAL | Age: 63
End: 2018-10-28

## 2018-11-01 LAB
BACTERIA SPEC CULT: NO GROWTH
BACTERIA SPEC CULT: NO GROWTH
Lab: NORMAL
Lab: NORMAL
SPECIMEN SOURCE: NORMAL
SPECIMEN SOURCE: NORMAL

## 2018-11-27 ENCOUNTER — RECORDS - HEALTHEAST (OUTPATIENT)
Dept: LAB | Facility: CLINIC | Age: 63
End: 2018-11-27

## 2018-11-27 LAB
ALBUMIN SERPL-MCNC: 3.6 G/DL (ref 3.5–5)
ALP SERPL-CCNC: 207 U/L (ref 45–120)
ALT SERPL W P-5'-P-CCNC: 14 U/L (ref 0–45)
AST SERPL W P-5'-P-CCNC: 23 U/L (ref 0–40)
BILIRUB DIRECT SERPL-MCNC: 0.6 MG/DL
BILIRUB SERPL-MCNC: 1.4 MG/DL (ref 0–1)
ERYTHROCYTE [DISTWIDTH] IN BLOOD BY AUTOMATED COUNT: 14 % (ref 11–14.5)
HCT VFR BLD AUTO: 37.9 % (ref 40–54)
HGB BLD-MCNC: 12.3 G/DL (ref 14–18)
MCH RBC QN AUTO: 28.9 PG (ref 27–34)
MCHC RBC AUTO-ENTMCNC: 32.5 G/DL (ref 32–36)
MCV RBC AUTO: 89 FL (ref 80–100)
PLATELET # BLD AUTO: 225 THOU/UL (ref 140–440)
PMV BLD AUTO: 10.3 FL (ref 8.5–12.5)
PROT SERPL-MCNC: 6.8 G/DL (ref 6–8)
RBC # BLD AUTO: 4.25 MILL/UL (ref 4.4–6.2)
WBC: 6.9 THOU/UL (ref 4–11)

## 2018-12-07 NOTE — PATIENT INSTRUCTIONS
Please stop your aspirin starting 7 days before your surgery or procedure. You can restart it as soon as your surgeon tells you that it is OK to do so.   You may continue your other medications.    You may schedule a follow-up appointment soon, if you wish to discuss your chronic issues.      Before Your Surgery      Call your surgeon if there is any change in your health. This includes signs of a cold or flu (such as a sore throat, runny nose, cough, rash or fever).    Do not smoke, drink alcohol or take over the counter medicine (unless your surgeon or primary care doctor tells you to) for the 24 hours before and after surgery.    If you take prescribed drugs: Follow your doctor s orders about which medicines to take and which to stop until after surgery.    Eating and drinking prior to surgery: follow the instructions from your surgeon    Take a shower or bath the night before surgery. Use the soap your surgeon gave you to gently clean your skin. If you do not have soap from your surgeon, use your regular soap. Do not shave or scrub the surgery site.  Wear clean pajamas and have clean sheets on your bed.    Muscle spasm

## 2019-01-07 ENCOUNTER — RECORDS - HEALTHEAST (OUTPATIENT)
Dept: LAB | Facility: CLINIC | Age: 64
End: 2019-01-07

## 2019-01-07 DIAGNOSIS — E78.5 HYPERLIPIDEMIA LDL GOAL <130: ICD-10-CM

## 2019-01-07 DIAGNOSIS — Z01.818 PREOP GENERAL PHYSICAL EXAM: ICD-10-CM

## 2019-01-07 LAB
ANION GAP SERPL CALCULATED.3IONS-SCNC: 7 MMOL/L (ref 5–18)
BUN SERPL-MCNC: 13 MG/DL (ref 8–22)
CALCIUM SERPL-MCNC: 9.1 MG/DL (ref 8.5–10.5)
CHLORIDE BLD-SCNC: 104 MMOL/L (ref 98–107)
CO2 SERPL-SCNC: 28 MMOL/L (ref 22–31)
CREAT SERPL-MCNC: 0.95 MG/DL (ref 0.7–1.3)
ERYTHROCYTE [DISTWIDTH] IN BLOOD BY AUTOMATED COUNT: 13.2 % (ref 11–14.5)
GFR SERPL CREATININE-BSD FRML MDRD: >60 ML/MIN/1.73M2
GLUCOSE BLD-MCNC: 117 MG/DL (ref 70–125)
HCT VFR BLD AUTO: 39.5 % (ref 40–54)
HGB BLD-MCNC: 12.8 G/DL (ref 14–18)
MCH RBC QN AUTO: 29.2 PG (ref 27–34)
MCHC RBC AUTO-ENTMCNC: 32.4 G/DL (ref 32–36)
MCV RBC AUTO: 90 FL (ref 80–100)
PLATELET # BLD AUTO: 187 THOU/UL (ref 140–440)
PMV BLD AUTO: 10.2 FL (ref 8.5–12.5)
POTASSIUM BLD-SCNC: 4.3 MMOL/L (ref 3.5–5)
RBC # BLD AUTO: 4.38 MILL/UL (ref 4.4–6.2)
SODIUM SERPL-SCNC: 139 MMOL/L (ref 136–145)
WBC: 8.2 THOU/UL (ref 4–11)

## 2019-01-08 ENCOUNTER — RECORDS - HEALTHEAST (OUTPATIENT)
Dept: ADMINISTRATIVE | Facility: OTHER | Age: 64
End: 2019-01-08

## 2019-01-08 NOTE — TELEPHONE ENCOUNTER
"Routing refill request to provider for review/approval because:  Drug not active on patient's medication list  Patient needs to be seen because it has been more than 1 year since last office visit.    Requested Prescriptions   Pending Prescriptions Disp Refills     simvastatin (ZOCOR) 20 MG tablet [Pharmacy Med Name: SIMVASTATIN TABS 20MG] 90 tablet 0     Sig: TAKE 1 TABLET AT BEDTIME (NEED APPOINTMENT FOR FURTHER REFILLS)    Statins Protocol Failed - 1/7/2019  3:29 PM       Failed - Recent (12 mo) or future (30 days) visit within the authorizing provider's specialty    Patient had office visit in the last 12 months or has a visit in the next 30 days with authorizing provider or within the authorizing provider's specialty.  See \"Patient Info\" tab in inbasket, or \"Choose Columns\" in Meds & Orders section of the refill encounter.             Failed - Medication is active on med list       Passed - LDL on file in past 12 months    Recent Labs   Lab Test 04/24/18  1444   LDL 95            Passed - No abnormal creatine kinase in past 12 months    No lab results found.            Passed - Patient is age 18 or older        Shayna Maicas RN  "

## 2019-01-09 RX ORDER — SIMVASTATIN 20 MG
TABLET ORAL
Qty: 90 TABLET | Refills: 0 | OUTPATIENT
Start: 2019-01-09

## 2019-01-09 NOTE — TELEPHONE ENCOUNTER
Patient called stating they no longer come to this clinic to be seen because they moved to [facility] and will do it through their new clinic  Chidi Garcia CMA on 1/9/2019 at 3:38 PM

## 2019-01-17 ENCOUNTER — RECORDS - HEALTHEAST (OUTPATIENT)
Dept: LAB | Facility: CLINIC | Age: 64
End: 2019-01-17

## 2019-01-17 LAB
ALBUMIN SERPL-MCNC: 3.3 G/DL (ref 3.5–5)
ALP SERPL-CCNC: 138 U/L (ref 45–120)
ALT SERPL W P-5'-P-CCNC: 24 U/L (ref 0–45)
AST SERPL W P-5'-P-CCNC: 20 U/L (ref 0–40)
BASOPHILS # BLD AUTO: 0 THOU/UL (ref 0–0.2)
BASOPHILS NFR BLD AUTO: 1 % (ref 0–2)
BILIRUB DIRECT SERPL-MCNC: 0.3 MG/DL
BILIRUB SERPL-MCNC: 0.6 MG/DL (ref 0–1)
EOSINOPHIL # BLD AUTO: 0.1 THOU/UL (ref 0–0.4)
EOSINOPHIL NFR BLD AUTO: 2 % (ref 0–6)
ERYTHROCYTE [DISTWIDTH] IN BLOOD BY AUTOMATED COUNT: 12.9 % (ref 11–14.5)
HCT VFR BLD AUTO: 34.8 % (ref 40–54)
HGB BLD-MCNC: 11.3 G/DL (ref 14–18)
LYMPHOCYTES # BLD AUTO: 1 THOU/UL (ref 0.8–4.4)
LYMPHOCYTES NFR BLD AUTO: 19 % (ref 20–40)
MCH RBC QN AUTO: 29.2 PG (ref 27–34)
MCHC RBC AUTO-ENTMCNC: 32.5 G/DL (ref 32–36)
MCV RBC AUTO: 90 FL (ref 80–100)
MONOCYTES # BLD AUTO: 0.5 THOU/UL (ref 0–0.9)
MONOCYTES NFR BLD AUTO: 10 % (ref 2–10)
NEUTROPHILS # BLD AUTO: 3.5 THOU/UL (ref 2–7.7)
NEUTROPHILS NFR BLD AUTO: 68 % (ref 50–70)
PLATELET # BLD AUTO: 214 THOU/UL (ref 140–440)
PMV BLD AUTO: 10.3 FL (ref 8.5–12.5)
PROT SERPL-MCNC: 6.2 G/DL (ref 6–8)
RBC # BLD AUTO: 3.87 MILL/UL (ref 4.4–6.2)
WBC: 5.2 THOU/UL (ref 4–11)

## 2019-01-22 ENCOUNTER — TRANSFERRED RECORDS (OUTPATIENT)
Dept: HEALTH INFORMATION MANAGEMENT | Facility: CLINIC | Age: 64
End: 2019-01-22

## 2019-02-15 ENCOUNTER — HEALTH MAINTENANCE LETTER (OUTPATIENT)
Age: 64
End: 2019-02-15

## 2019-03-12 ENCOUNTER — RECORDS - HEALTHEAST (OUTPATIENT)
Dept: LAB | Facility: CLINIC | Age: 64
End: 2019-03-12

## 2019-03-12 LAB
ANION GAP SERPL CALCULATED.3IONS-SCNC: 7 MMOL/L (ref 5–18)
BUN SERPL-MCNC: 13 MG/DL (ref 8–22)
CALCIUM SERPL-MCNC: 9 MG/DL (ref 8.5–10.5)
CHLORIDE BLD-SCNC: 104 MMOL/L (ref 98–107)
CO2 SERPL-SCNC: 27 MMOL/L (ref 22–31)
CREAT SERPL-MCNC: 0.86 MG/DL (ref 0.7–1.3)
GFR SERPL CREATININE-BSD FRML MDRD: >60 ML/MIN/1.73M2
GLUCOSE BLD-MCNC: 89 MG/DL (ref 70–125)
HGB BLD-MCNC: 12.4 G/DL (ref 14–18)
POTASSIUM BLD-SCNC: 4.2 MMOL/L (ref 3.5–5)
SODIUM SERPL-SCNC: 138 MMOL/L (ref 136–145)

## 2019-09-12 ENCOUNTER — RECORDS - HEALTHEAST (OUTPATIENT)
Dept: LAB | Facility: CLINIC | Age: 64
End: 2019-09-12

## 2019-09-12 LAB
ALBUMIN UR-MCNC: NEGATIVE MG/DL
APPEARANCE UR: CLEAR
BILIRUB UR QL STRIP: NEGATIVE
COLOR UR AUTO: YELLOW
GLUCOSE UR STRIP-MCNC: NEGATIVE MG/DL
HGB UR QL STRIP: NEGATIVE
KETONES UR STRIP-MCNC: NEGATIVE MG/DL
LEUKOCYTE ESTERASE UR QL STRIP: NEGATIVE
NITRATE UR QL: NEGATIVE
PH UR STRIP: 6 [PH] (ref 4.5–8)
SP GR UR STRIP: 1.02 (ref 1–1.03)
UROBILINOGEN UR STRIP-ACNC: NORMAL

## 2019-09-15 LAB
BACTERIA SPEC CULT: ABNORMAL
BACTERIA SPEC CULT: ABNORMAL

## 2019-09-17 ENCOUNTER — RECORDS - HEALTHEAST (OUTPATIENT)
Dept: LAB | Facility: CLINIC | Age: 64
End: 2019-09-17

## 2019-09-17 LAB
ANION GAP SERPL CALCULATED.3IONS-SCNC: 8 MMOL/L (ref 5–18)
BUN SERPL-MCNC: 15 MG/DL (ref 8–22)
CALCIUM SERPL-MCNC: 9.3 MG/DL (ref 8.5–10.5)
CHLORIDE BLD-SCNC: 105 MMOL/L (ref 98–107)
CO2 SERPL-SCNC: 27 MMOL/L (ref 22–31)
CREAT SERPL-MCNC: 0.92 MG/DL (ref 0.7–1.3)
ERYTHROCYTE [DISTWIDTH] IN BLOOD BY AUTOMATED COUNT: 13.2 % (ref 11–14.5)
GFR SERPL CREATININE-BSD FRML MDRD: >60 ML/MIN/1.73M2
GLUCOSE BLD-MCNC: 98 MG/DL (ref 70–125)
HCT VFR BLD AUTO: 37.7 % (ref 40–54)
HGB BLD-MCNC: 11.9 G/DL (ref 14–18)
MCH RBC QN AUTO: 28.3 PG (ref 27–34)
MCHC RBC AUTO-ENTMCNC: 31.6 G/DL (ref 32–36)
MCV RBC AUTO: 90 FL (ref 80–100)
PLATELET # BLD AUTO: 221 THOU/UL (ref 140–440)
PMV BLD AUTO: 10.3 FL (ref 8.5–12.5)
POTASSIUM BLD-SCNC: 4.3 MMOL/L (ref 3.5–5)
RBC # BLD AUTO: 4.21 MILL/UL (ref 4.4–6.2)
SODIUM SERPL-SCNC: 140 MMOL/L (ref 136–145)
TSH SERPL DL<=0.005 MIU/L-ACNC: 0.56 UIU/ML (ref 0.3–5)
VIT B12 SERPL-MCNC: 367 PG/ML (ref 213–816)
WBC: 7.4 THOU/UL (ref 4–11)

## 2019-10-03 ENCOUNTER — RECORDS - HEALTHEAST (OUTPATIENT)
Dept: LAB | Facility: CLINIC | Age: 64
End: 2019-10-03

## 2019-10-03 LAB
ALBUMIN UR-MCNC: NEGATIVE MG/DL
APPEARANCE UR: CLEAR
BILIRUB UR QL STRIP: NEGATIVE
COLOR UR AUTO: YELLOW
GLUCOSE UR STRIP-MCNC: NEGATIVE MG/DL
HGB UR QL STRIP: NEGATIVE
KETONES UR STRIP-MCNC: NEGATIVE MG/DL
LEUKOCYTE ESTERASE UR QL STRIP: NEGATIVE
NITRATE UR QL: NEGATIVE
PH UR STRIP: 6.5 [PH] (ref 4.5–8)
SP GR UR STRIP: 1.02 (ref 1–1.03)
UROBILINOGEN UR STRIP-ACNC: NORMAL

## 2019-10-04 LAB — BACTERIA SPEC CULT: NO GROWTH

## 2020-02-23 ENCOUNTER — HEALTH MAINTENANCE LETTER (OUTPATIENT)
Age: 65
End: 2020-02-23

## 2020-12-12 ENCOUNTER — HEALTH MAINTENANCE LETTER (OUTPATIENT)
Age: 65
End: 2020-12-12

## 2021-04-11 ENCOUNTER — HEALTH MAINTENANCE LETTER (OUTPATIENT)
Age: 66
End: 2021-04-11

## 2021-06-02 VITALS
WEIGHT: 134.3 LBS | HEIGHT: 66 IN | BODY MASS INDEX: 21.58 KG/M2 | HEIGHT: 66 IN | BODY MASS INDEX: 21.58 KG/M2 | WEIGHT: 134.3 LBS

## 2021-06-21 NOTE — ANESTHESIA CARE TRANSFER NOTE
Last vitals:   Vitals:    10/28/18 1525   BP: 161/84   Pulse: 64   Resp: 20   Temp: 36.7  C (98.1  F)   SpO2: 100%   In OR, spont respir, , ALDRICH, sustained head lift, sx and extubated to 02 via FM, spont respir, transported to PACU, VSS, report to RN.   Patient's level of consciousness is drowsy  Spontaneous respirations: yes  Maintains airway independently: yes  Dentition unchanged: yes  Oropharynx: oropharynx clear of all foreign objects    QCDR Measures:  ASA# 20 - Surgical Safety Checklist: WHO surgical safety checklist completed prior to induction  PQRS# 430 - Adult PONV Prevention: 4558F - Pt received => 2 anti-emetic agents (different classes) preop & intraop  ASA# 8 - Peds PONV Prevention: NA - Not pediatric patient, not GA or 2 or more risk factors NOT present  PQRS# 424 - Rashida-op Temp Management: 4559F - At least one body temp DOCUMENTED => 35.5C or 95.9F within required timeframe  PQRS# 426 - PACU Transfer Protocol: - Transfer of care checklist used  ASA# 14 - Acute Post-op Pain: ASA14B - Patient did NOT experience pain >= 7 out of 10

## 2021-06-21 NOTE — ANESTHESIA POSTPROCEDURE EVALUATION
Patient: Tanisha Albrecht  ENDOSCOPIC RETROGRADE CHOLANGIOPANCREATOGRAPHY, SPHINCTEROTOMY, STENT PLACEMENT, MECHANICAL LITHOTRIPSY, STONE EXTRACTION  Anesthesia type: general    Patient location: PACU  Last vitals:   Vitals:    10/27/18 1230   BP: 140/79   Pulse: 63   Resp: 12   Temp: 36.9  C (98.4  F)   SpO2: 97%     Post vital signs: stable  Level of consciousness: awake and responds to simple questions  Post-anesthesia pain: pain controlled  Post-anesthesia nausea and vomiting: no  Pulmonary: unassisted, return to baseline  Cardiovascular: stable and blood pressure at baseline  Hydration: adequate  Anesthetic events: no    QCDR Measures:  ASA# 11 - Rashida-op Cardiac Arrest: ASA11B - Patient did NOT experience unanticipated cardiac arrest  ASA# 12 - Rashida-op Mortality Rate: ASA12B - Patient did NOT die  ASA# 13 - PACU Re-Intubation Rate: ASA13B - Patient did NOT require a new airway mgmt  ASA# 10 - Composite Anes Safety: ASA10A - No serious adverse event    Additional Notes: Patient looks improved from his pre-operative evaluation.  More alert, awake, and better color.  He is verbalized comfort and appreciation.

## 2021-06-21 NOTE — ANESTHESIA CARE TRANSFER NOTE
Last vitals:   Vitals:    10/27/18 1155   BP: 152/73   Pulse: 68   Resp: 14   Temp: 36.4  C (97.6  F)   SpO2: 100%     Patient's level of consciousness is awake  Spontaneous respirations: yes  Maintains airway independently: yes  Dentition unchanged: yes  Oropharynx: oropharynx clear of all foreign objects    QCDR Measures:  ASA# 20 - Surgical Safety Checklist: WHO surgical safety checklist completed prior to induction  PQRS# 430 - Adult PONV Prevention: 4558F - Pt received => 2 anti-emetic agents (different classes) preop & intraop  ASA# 8 - Peds PONV Prevention: NA - Not pediatric patient, not GA or 2 or more risk factors NOT present  PQRS# 424 - Rashida-op Temp Management: 4559F - At least one body temp DOCUMENTED => 35.5C or 95.9F within required timeframe  PQRS# 426 - PACU Transfer Protocol: - Transfer of care checklist used  ASA# 14 - Acute Post-op Pain: ASA14B - Patient did NOT experience pain >= 7 out of 10

## 2021-06-21 NOTE — ANESTHESIA POSTPROCEDURE EVALUATION
Patient: Tanisha Albrecht  CHOLECYSTECTOMY, LAPAROSCOPIC  Anesthesia type: general    Patient location: PACU  Last vitals:   Vitals:    10/28/18 1643   BP: 157/83   Pulse: (!) 59   Resp: 21   Temp: 36.9  C (98.4  F)   SpO2: 97%     Post vital signs: stable  Level of consciousness: awake and responds to simple questions  Post-anesthesia pain: pain controlled  Post-anesthesia nausea and vomiting: no  Pulmonary: unassisted, return to baseline  Cardiovascular: stable and blood pressure at baseline  Hydration: adequate  Anesthetic events: no    QCDR Measures:  ASA# 11 - Rashida-op Cardiac Arrest: ASA11B - Patient did NOT experience unanticipated cardiac arrest  ASA# 12 - Rashida-op Mortality Rate: ASA12B - Patient did NOT die  ASA# 13 - PACU Re-Intubation Rate: ASA13B - Patient did NOT require a new airway mgmt  ASA# 10 - Composite Anes Safety: ASA10A - No serious adverse event    Additional Notes:

## 2021-07-03 NOTE — ANESTHESIA PREPROCEDURE EVALUATION
Anesthesia Preprocedure Evaluation by Richard Acosta MD at 10/28/2018  1:22 PM     Author: Richard Acosta MD Service: -- Author Type: Physician    Filed: 10/28/2018  1:58 PM Date of Service: 10/28/2018  1:22 PM Status: Addendum    : Richard Acosta MD (Physician)    Related Notes: Original Note by Richard Acosta MD (Physician) filed at 10/28/2018  1:54 PM       Anesthesia Evaluation      Patient summary reviewed   No history of anesthetic complications     Airway   Mallampati: II  Neck ROM: full   Pulmonary     breath sounds clear to auscultation  (+) a smoker (former smoker)  (-) COPD, asthma, sleep apnea, rhonchi, wheezes, rales, stridor                         Cardiovascular   Exercise tolerance: < 4 METS  (+) , hypercholesterolemia,     (-) hypertension, past MI, CAD, murmur  ECG reviewed (sinus marilee rate of 59)  Rhythm: regular  Rate: normal,    no murmur      Neuro/Psych    (+) dementia (Lewy body dementia, on aricept.  Lives in a [facility] nursing facility.),     Endo/Other    (-) no diabetes, hypothyroidism, no obesity     Comments: BPH    GI/Hepatic/Renal    (+) GERD,       Comments: Choledocholithiasis with associated obstructive jaundice     Other findings: 63 year old male with significant dementia now presenting with jaundice and RUQ pain as well as N/V.  Found to have gall stones and s/p ERCP w/ plan for lap cholecystectomy.    Labs 10/28/18:  WBC 6.3, Hgb 11.8, Plt 240  Na 138, K 4.3, BUN 10, Cr 0.79  T bili 7.0, direct bili 4.0, alk phos 787, ast 131, alt 171  Lactic acid 0.5  INR 1.03 Interactive, oriented to person and situation but not to date / time      Dental                               Anesthesia Plan  Planned anesthetic: general endotracheal and peripheral nerve block  GETA.  Avoid benzodiazepines, anticholinergics, meperidine 2/2 risk of postoperative delirium.  Modified RSI w/ rocuronium.  Dexamethasone and zofran for PONV.  Plan for subcostal TAP block for postoperative pain control per  surgeon preference.  Consent obtained over the phone from his wife.    ASA 3   Induction: intravenous   Anesthetic plan and risks discussed with: patient  Anesthesia plan special considerations: antiemetics,   Post-op plan: routine recovery

## 2021-07-03 NOTE — ANESTHESIA PREPROCEDURE EVALUATION
Anesthesia Preprocedure Evaluation by Catherine Cobb MD at 10/27/2018  9:31 AM     Author: Catherine Cobb MD Service: -- Author Type: Physician    Filed: 10/27/2018 10:18 AM Date of Service: 10/27/2018  9:31 AM Status: Addendum    : Catherine Cobb MD (Physician)    Related Notes: Original Note by Catherine Cobb MD (Physician) filed at 10/27/2018  9:34 AM       Anesthesia Evaluation        Airway   Mallampati: II  Neck ROM: limited  Comment: Minimal effort from patient   Pulmonary - normal exam   (-) not a smoker                         Cardiovascular   ECG reviewed (sinus marilee rate of 59)  Rhythm: regular  Rate: normal,         Neuro/Psych    (+) dementia (Lewy body dementia, on aricept.  [facility]     Endo/Other       Comments: BPH    GI/Hepatic/Renal    (+) GERD,       Comments: Choledocholithiasis with associated obstructive jaundice     Other findings: 63 year old male with significant dementia now presenting with jaundice and RUQ pain as well as N/V.  Found to have gall stones.  Plan for ERCP today with Dr. Saldivar.  BMI 19, NKDA.    Results for orders placed or performed during the hospital encounter of 10/26/18  -Comprehensive metabolic panel       Result                                            Value                         Ref Range                       Sodium                                            138                           136 - 145 mmol/L                Potassium                                         4.2                           3.5 - 5.0 mmol/L                Chloride                                          102                           98 - 107 mmol/L                 CO2                                               25                            22 - 31 mmol/L                  Anion Gap, Calculation                            11                            5 - 18 mmol/L                   Glucose                                            86                            70 - 125 mg/dL                  BUN                                               10                            8 - 22 mg/dL                    Creatinine                                        0.79                          0.70 - 1.30 mg/dL               GFR MDRD Af Amer                                  >60                           >60 mL/min/1.73m2               GFR MDRD Non Af Amer                              >60                           >60 mL/min/1.73m2               Bilirubin, Total                                  7.8 (H)                       0.0 - 1.0 mg/dL                 Calcium                                           9.2                           8.5 - 10.5 mg/dL                Protein, Total                                    6.3                           6.0 - 8.0 g/dL                  Albumin                                           3.1 (L)                       3.5 - 5.0 g/dL                  Alkaline Phosphatase                              969 (H)                       45 - 120 U/L                    AST                                               184 (H)                       0 - 40 U/L                      ALT                                               215 (H)                       0 - 45 U/L                 Lab Results       Component                Value               Date                       WBC                      6.3                 10/27/2018                 HGB                      11.8 (L)            10/27/2018                 HCT                      35.3 (L)            10/27/2018                 MCV                      88                  10/27/2018                 PLT                      240                 10/27/2018             Jaundiced, sedate/obtunded/dementia  Minimally interactive      Dental                             Anesthesia Plan  Planned anesthetic: general endotracheal  Attempted to call wife x 2 as she is medical decision  maker.  Unsuccessful.    ASA 3 - emergent   Induction: intravenous   Anesthetic plan and risks discussed with: patient  Anesthesia plan special considerations: antiemetics,   Post-op plan: routine recovery

## 2021-09-26 ENCOUNTER — HEALTH MAINTENANCE LETTER (OUTPATIENT)
Age: 66
End: 2021-09-26

## 2022-05-08 ENCOUNTER — HEALTH MAINTENANCE LETTER (OUTPATIENT)
Age: 67
End: 2022-05-08

## 2023-01-08 ENCOUNTER — HEALTH MAINTENANCE LETTER (OUTPATIENT)
Age: 68
End: 2023-01-08

## 2023-06-02 ENCOUNTER — HEALTH MAINTENANCE LETTER (OUTPATIENT)
Age: 68
End: 2023-06-02